# Patient Record
Sex: MALE | Race: WHITE | NOT HISPANIC OR LATINO | Employment: FULL TIME | URBAN - METROPOLITAN AREA
[De-identification: names, ages, dates, MRNs, and addresses within clinical notes are randomized per-mention and may not be internally consistent; named-entity substitution may affect disease eponyms.]

---

## 2023-10-28 LAB
NON-UH HIE ALANINE AMINOTRANSFERASE:CCNC:PT:SER/PLAS:QN:NO ADDITION OF P-5': 17 INT._UNIT/L (ref 6–46)
NON-UH HIE ALBUMIN/GLOBULIN:MCRTO:PT:SER:QN:: 1.6 (ref 1.1–2.2)
NON-UH HIE ALBUMIN:MCNC:PT:SER/PLAS:QN:: 4.2 GM/DL (ref 3.3–5)
NON-UH HIE ALKALINE PHOSPHATASE:CCNC:PT:SER/PLAS:QN:: 70 INT._UNIT/L (ref 21–98)
NON-UH HIE ANION GAP:SCNC:PT:SER/PLAS:QN:: 14 MEQ/L (ref 6–16)
NON-UH HIE ASPARTATE AMINOTRANSFERASE:CCNC:PT:SER/PLAS:QN:: 20 INT._UNIT/L (ref 5–43)
NON-UH HIE BASOPHILS/LEUKOCYTES:NFR.DF:PT:BLD:QN:AUTOMATED COUNT: 0.1 E9/L (ref 0–0.2)
NON-UH HIE BASOPHILS:NCNC:PT:BLD:QN:AUTOMATED COUNT: 1.2 % (ref 0–2)
NON-UH HIE BILIRUBIN:MCNC:PT:SER/PLAS:QN:: 1.3 MG/DL (ref 0–1.1)
NON-UH HIE CALCIUM:MCNC:PT:SER/PLAS:QN:: 9 MG/DL (ref 8.9–11.1)
NON-UH HIE CARBON DIOXIDE:SCNC:PT:SER/PLAS:QN:: 25 MMOL/L (ref 21–31)
NON-UH HIE CHLORIDE:SCNC:PT:SER/PLAS:QN:: 103 MMOL/L (ref 101–111)
NON-UH HIE CREATININE:MCNC:PT:SER/PLAS:QN:: 1.6 MG/DL (ref 0.5–1.3)
NON-UH HIE EOSINOPHILS/LEUKOCYTES:NFR.DF:PT:BLD:QN:AUTOMATED COUNT: 0.4 E9/L (ref 0–0.5)
NON-UH HIE EOSINOPHILS:NCNC:PT:BLD:QN:AUTOMATED COUNT: 4.9 % (ref 0–8)
NON-UH HIE ERYTHROCYTE DISTRIBUTION WIDTH:RATIO:PT:RBC:QN:AUTOMATED COUNT: 14.5 % (ref 10.9–14.2)
NON-UH HIE ERYTHROCYTE MEAN CORPUSCULAR HEMOGLOBIN CONCENTRATION:MCNC:PT:RB: 32.6 GM/DL (ref 31.4–36)
NON-UH HIE ERYTHROCYTE MEAN CORPUSCULAR HEMOGLOBIN:ENTMASS:PT:RBC:QN:AUTOMA: 28.2 PG (ref 27–34)
NON-UH HIE ERYTHROCYTE MEAN CORPUSCULAR VOLUME:ENTVOL:PT:RBC:QN:AUTOMATED C: 86.4 FL (ref 80–100)
NON-UH HIE ERYTHROCYTES:NCNC:PT:BLD:QN:AUTOMATED COUNT: 4.6 E12/L (ref 4.3–5.9)
NON-UH HIE GLOBULIN:MCNC:PT:SER:QN:CALCULATED: 2.7 GM/DL (ref 1.4–4)
NON-UH HIE GLOMERULAR FILTRATION RATE/1.73 SQ M.PREDICTED.NON BLACK:ARVRAT:: 46 ML/MIN/1.73 M2
NON-UH HIE GLUCOSE:MCNC:PT:BLD:QN:: 107 MG/DL (ref 55–199)
NON-UH HIE HEMATOCRIT:VFR:PT:BLD:QN:AUTOMATED COUNT: 39.5 % (ref 37.7–49)
NON-UH HIE HEMOGLOBIN:MCNC:PT:BLD:QN:: 12.9 GM/DL (ref 13.5–17.5)
NON-UH HIE LEUKOCYTES: 7.5 E9/L (ref 4–11)
NON-UH HIE LYMPHOCYTES/LEUKOCYTES:NFR.DF:PT:BLD:QN:AUTOMATED COUNT: 1.8 E9/L (ref 1–4)
NON-UH HIE LYMPHOCYTES:NCNC:PT:BLD:QN:AUTOMATED COUNT: 24.1 % (ref 14–50)
NON-UH HIE MONOCYTES/LEUKOCYTES:NFR.DF:PT:BLD:QN:AUTOMATED COUNT: 0.7 E9/L (ref 0.2–1)
NON-UH HIE MONOCYTES:NCNC:PT:BLD:QN:AUTOMATED COUNT: 9.1 % (ref 4–14)
NON-UH HIE NEUTROPHILS/LEUKOCYTES:NFR.DF:PT:BLD:QN:AUTOMATED COUNT: 4.5 E9/L (ref 2–7.5)
NON-UH HIE NEUTROPHILS:NCNC:PT:BLD:QN:AUTOMATED COUNT: 60.7 % (ref 36–75)
NON-UH HIE PLATELET MEAN VOLUME:ENTVOL:PT:BLD:QN:AUTOMATED COUNT: 7.3 FL (ref 6.4–10.8)
NON-UH HIE PLATELETS:NCNC:PT:BLD:QN:AUTOMATED COUNT: 193 E9/L (ref 150–500)
NON-UH HIE POTASSIUM:SCNC:PT:SER/PLAS:QN:: 4 MMOL/L (ref 3.5–5.3)
NON-UH HIE PROTEIN:MCNC:PT:SER/PLAS:QN:: 6.9 GM/DL (ref 6–7.8)
NON-UH HIE SODIUM:SCNC:PT:SER/PLAS:QN:: 138 MMOL/L (ref 135–145)
NON-UH HIE UREA NITROGEN/CREATININE:MRTO:PT:SER/PLAS:QN:: 18 NO UNITS (ref 10–20)
NON-UH HIE UREA NITROGEN:MCNC:PT:SER/PLAS:QN:: 29 MG/DL (ref 5–21)

## 2023-10-31 LAB — Lab: 4.7 NG/ML (ref 2–20)

## 2023-11-27 ENCOUNTER — OFFICE VISIT (OUTPATIENT)
Dept: TRANSPLANT | Facility: HOSPITAL | Age: 68
End: 2023-11-27
Payer: MEDICARE

## 2023-11-27 ENCOUNTER — APPOINTMENT (OUTPATIENT)
Dept: TRANSPLANT | Facility: HOSPITAL | Age: 68
End: 2023-11-27

## 2023-11-27 VITALS
SYSTOLIC BLOOD PRESSURE: 134 MMHG | OXYGEN SATURATION: 95 % | HEART RATE: 72 BPM | BODY MASS INDEX: 34.85 KG/M2 | TEMPERATURE: 97.7 F | DIASTOLIC BLOOD PRESSURE: 82 MMHG | WEIGHT: 229.2 LBS

## 2023-11-27 DIAGNOSIS — Z94.0 KIDNEY REPLACED BY TRANSPLANT (HHS-HCC): Primary | ICD-10-CM

## 2023-11-27 DIAGNOSIS — Z94.4 LIVER REPLACED BY TRANSPLANT (MULTI): ICD-10-CM

## 2023-11-27 DIAGNOSIS — Z92.25 PERSONAL HISTORY OF IMMUNOSUPRESSION THERAPY: ICD-10-CM

## 2023-11-27 PROCEDURE — 99213 OFFICE O/P EST LOW 20 MIN: CPT

## 2023-11-27 PROCEDURE — 99214 OFFICE O/P EST MOD 30 MIN: CPT | Performed by: INTERNAL MEDICINE

## 2023-11-27 PROCEDURE — 99214 OFFICE O/P EST MOD 30 MIN: CPT | Mod: 25 | Performed by: INTERNAL MEDICINE

## 2023-11-27 PROCEDURE — 1159F MED LIST DOCD IN RCRD: CPT

## 2023-11-27 PROCEDURE — 1126F AMNT PAIN NOTED NONE PRSNT: CPT

## 2023-11-27 PROCEDURE — 1159F MED LIST DOCD IN RCRD: CPT | Performed by: INTERNAL MEDICINE

## 2023-11-27 PROCEDURE — 1126F AMNT PAIN NOTED NONE PRSNT: CPT | Performed by: INTERNAL MEDICINE

## 2023-11-27 RX ORDER — LANOLIN ALCOHOL/MO/W.PET/CERES
CREAM (GRAM) TOPICAL
COMMUNITY
Start: 2016-12-22

## 2023-11-27 RX ORDER — TACROLIMUS 1 MG/1
1 CAPSULE ORAL EVERY 12 HOURS
COMMUNITY
End: 2024-05-17 | Stop reason: SDUPTHER

## 2023-11-27 RX ORDER — ATORVASTATIN CALCIUM 10 MG/1
10 TABLET, FILM COATED ORAL DAILY
COMMUNITY

## 2023-11-27 RX ORDER — GABAPENTIN 300 MG/1
300 CAPSULE ORAL 3 TIMES DAILY
COMMUNITY
End: 2024-05-17 | Stop reason: SDUPTHER

## 2023-11-27 RX ORDER — CICLOPIROX 7.7 MG/G
GEL TOPICAL
COMMUNITY
Start: 2023-06-13

## 2023-11-27 RX ORDER — PREDNISONE 5 MG/1
TABLET ORAL
COMMUNITY
Start: 2017-03-23

## 2023-11-27 RX ORDER — MULTIVITAMIN
1 TABLET ORAL DAILY
COMMUNITY
Start: 2018-02-23

## 2023-11-27 RX ORDER — METOPROLOL TARTRATE 25 MG/1
1 TABLET, FILM COATED ORAL 2 TIMES DAILY
COMMUNITY

## 2023-11-27 RX ORDER — FLUTICASONE PROPIONATE 50 MCG
SPRAY, SUSPENSION (ML) NASAL
COMMUNITY
Start: 2005-08-17

## 2023-11-27 RX ORDER — MYCOPHENOLATE MOFETIL 250 MG/1
500 CAPSULE ORAL EVERY 12 HOURS
COMMUNITY
End: 2024-05-17 | Stop reason: SDUPTHER

## 2023-11-27 RX ORDER — AMOXICILLIN 500 MG/1
CAPSULE ORAL
COMMUNITY
Start: 2023-08-31

## 2023-11-27 RX ORDER — TIZANIDINE 4 MG/1
TABLET ORAL
COMMUNITY
Start: 2023-10-18

## 2023-11-27 RX ORDER — ALLOPURINOL 300 MG/1
300 TABLET ORAL DAILY
COMMUNITY

## 2023-11-27 ASSESSMENT — PAIN SCALES - GENERAL: PAINLEVEL: 0-NO PAIN

## 2023-11-27 NOTE — PROGRESS NOTES
Subjective   Dandre Birmingham is a 68 y.o. male who underwent kidney and liver kidney transplant on 4/4/2016 (Kidney), 4/4/2016 (Liver). Here today for follow-up. Has back pain radiating to left leg due to pinched nerves.    Objective     Physical Exam  Constitutional:       Appearance: Normal appearance.   HENT:      Head: Normocephalic.      Mouth/Throat:      Mouth: Mucous membranes are moist.   Cardiovascular:      Rate and Rhythm: Normal rate and regular rhythm.   Pulmonary:      Effort: Pulmonary effort is normal.      Breath sounds: Normal breath sounds.   Abdominal:      General: Abdomen is flat.      Palpations: Abdomen is soft.   Musculoskeletal:         General: Normal range of motion.      Cervical back: Normal range of motion.   Skin:     General: Skin is warm and dry.   Neurological:      General: No focal deficit present.      Mental Status: He is alert.   Psychiatric:         Mood and Affect: Mood normal.       Lab Results   Component Value Date    CREATININE 1.79 (H) 10/12/2017    K 4.7 10/12/2017    GLUCOSE 123 (H) 10/12/2017    HCT 28.5 (L) 10/26/2017    WBC 4.0 (L) 10/26/2017     (L) 10/26/2017    CALCIUM 9.6 10/12/2017     Assessment/Plan     Stable renal allograft function. Therapeutic Tacrolimus level. No changes to immunosuppression.

## 2023-11-27 NOTE — PROGRESS NOTES
Subjective   Patient ID: Dandre Birmingham is a 68 y.o. male who presents for Liver Post Transplant Follow-up.Also kidney transplant .    HPI  Liver doing well post transplant in 2016.   No recurrance of HCC.   Liver and kidney function well.   Back pain with radiation down legs.   Otherwise feels well.      Review of Systems  No report of nausea vomiting or diarrhea.      Objective   Physical Exam  Physical Exam  Constitutional:       Appearance: Normal appearance.   Eyes:      General: No scleral icterus.  Cardiovascular:      Rate and Rhythm: Normal rate and regular rhythm.      Heart sounds: No murmur heard.     No gallop.   Pulmonary:      Effort: Pulmonary effort is normal.      Breath sounds: Normal breath sounds.   Abdominal:      General: Abdomen is flat. Bowel sounds are normal. There is no distension.      Palpations: Abdomen is soft. There is no fluid wave, hepatomegaly or splenomegaly.     Tenderness: There is no abdominal tenderness.   Musculoskeletal:      Cervical back: Normal range of motion. No tenderness.      Right lower leg: No edema.      Left lower leg: No edema.   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     Lymphadenopathy:      Cervical: No cervical adenopathy.   Skin:     Coloration: Skin is not jaundiced.   Neurological:      General: No focal deficit present.      Mental Status: She is alert.       Assessment/Plan     Doing well status post liver and kidney transplant.   Follow up in 6 months labs every 3 months.   Continue tacrolimus and low dose prednisone.

## 2024-01-27 LAB
NON-UH HIE A/G RATIO: 1.8 (ref 1.1–2.2)
NON-UH HIE AGAP: 13 MEQ/L (ref 6–16)
NON-UH HIE ALBUMIN LVL: 4.4 GM/DL (ref 3.3–5)
NON-UH HIE ALK PHOS: 65 INT._UNIT/L (ref 21–98)
NON-UH HIE ALT: 13 INT._UNIT/L (ref 6–46)
NON-UH HIE AST: 16 INT._UNIT/L (ref 5–43)
NON-UH HIE BASOPHIL ABSOLUTE: 0.1 E9/L (ref 0–0.2)
NON-UH HIE BASOPHIL AUTO: 1.1 % (ref 0–2)
NON-UH HIE BILI TOTAL: 1.7 MG/DL (ref 0–1.1)
NON-UH HIE BUN/CREAT RATIO: 15 NO UNITS (ref 10–20)
NON-UH HIE BUN: 22 MG/DL (ref 5–21)
NON-UH HIE CALCIUM LVL: 9.6 MG/DL (ref 8.9–11.1)
NON-UH HIE CHLORIDE: 102 MMOL/L (ref 101–111)
NON-UH HIE CO2: 28 MMOL/L (ref 21–31)
NON-UH HIE CREATININE: 1.5 MG/DL (ref 0.5–1.3)
NON-UH HIE EGFR: 50 ML/MIN/1.73 M2
NON-UH HIE EOS ABSOLUTE: 0.3 E9/L (ref 0–0.5)
NON-UH HIE EOS AUTO: 3.3 % (ref 0–8)
NON-UH HIE GLOBULIN: 2.4 GM/DL (ref 1.4–4)
NON-UH HIE GLUCOSE LVL: 102 MG/DL (ref 55–199)
NON-UH HIE HCT: 42 % (ref 37.7–49)
NON-UH HIE HGB: 13.8 GM/DL (ref 13.5–17.5)
NON-UH HIE LYMPH ABSOLUTE: 2.5 E9/L (ref 1–4)
NON-UH HIE LYMPH AUTO: 31.9 % (ref 14–50)
NON-UH HIE MCH: 28.5 PG (ref 27–34)
NON-UH HIE MCHC: 33 GM/DL (ref 31.4–36)
NON-UH HIE MCV: 86.2 FL (ref 80–100)
NON-UH HIE MONO ABSOLUTE: 0.6 E9/L (ref 0.2–1)
NON-UH HIE MONO AUTO: 8.2 % (ref 4–14)
NON-UH HIE MPV: 7.5 FL (ref 6.4–10.8)
NON-UH HIE NEUTRO ABSOLUTE: 4.3 E9/L (ref 2–7.5)
NON-UH HIE NEUTRO AUTO: 55.5 % (ref 36–75)
NON-UH HIE PLATELET: 197 E9/L (ref 150–500)
NON-UH HIE POTASSIUM LVL: 4 MMOL/L (ref 3.5–5.3)
NON-UH HIE RBC: 4.8 E12/L (ref 4.3–5.9)
NON-UH HIE RDW: 14.2 % (ref 10.9–14.2)
NON-UH HIE SODIUM LVL: 139 MMOL/L (ref 135–145)
NON-UH HIE TOTAL PROTEIN: 6.8 GM/DL (ref 6–7.8)
NON-UH HIE WBC: 7.8 E9/L (ref 4–11)

## 2024-01-30 LAB
ALANINE AMINOTRANSFERASE (SGPT) (U/L) IN SER/PLAS EXTERNAL: 13 U/L
ALBUMIN (G/DL) IN SER/PLAS EXTERNAL: 4.4 G/DL
ALKALINE PHOSPHATASE (U/L) IN SER/PLAS EXTERNAL: 65 U/L
ASPARTATE AMINOTRANSFERASE (SGOT) (U/L) IN SER/PLAS EXTERNAL: 16 U/L
BILIRUBIN TOTAL (MG/DL) IN SER/PLAS EXTERNAL: 13 MG/DL
CALCIUM (MG/DL) IN SER/PLAS EXTERNAL: 9.6 MG/DL
CARBON DIOXIDE, TOTAL (MMOL/L) IN SER/PLAS EXTERNAL: 28 MMOL/L
CHLORIDE (MMOL/L) IN SER/PLAS EXTERNAL: 102 MMOL/L
CREATININE (MG/DL) IN SER/PLAS EXTERNAL: 1.5 MG/DL
GLOMERULAR FILTRATION RATE ML/MIN/1.73 SQ M.PREDICTED EXTERNAL: 50 ML/MIN/1.73M*2
GLUCOSE (MG/DL) IN SER/PLAS EXTERNAL: 102 MG/DL
HEMATOCRIT (%) IN BLOOD BY AUTOMATED COUNT EXTERNAL: 42 %
HEMOGLOBIN (G/DL) IN BLOOD EXTERNAL: 13.8 G/DL
LEUKOCYTES (10*3/UL) IN BLOOD BY AUTOMATED COUNT EXTERNAL: 7.8 X10*3/UL
NRBC (PER 100 WBCS) BY AUTOMATED COUNT EXTERNAL: 4.8 /100 WBCS
PLATELETS (10*3/UL) IN BLOOD AUTOMATED COUNT EXTERNAL: 197 X10*3/UL
POTASSIUM (MMOL/L) IN SER/PLAS EXTERMA;: 4 MMOL/L
PROTEIN TOTAL EXTERNAL: 6.8 G/DL
SODIUM (MMOL/L) IN SER/PLAS EXTERNAL: 139 MMOL/L
UREA NITROGEN (MG/DL) IN SER/PLAS EXTERNAL: 22 MG/DL

## 2024-01-31 LAB — Lab: 4.8 NG/ML (ref 2–20)

## 2024-04-30 LAB
ALANINE AMINOTRANSFERASE (SGPT) (U/L) IN SER/PLAS EXTERNAL: 14 U/L
ALBUMIN (G/DL) IN SER/PLAS EXTERNAL: 4.4 G/DL
ALKALINE PHOSPHATASE (U/L) IN SER/PLAS EXTERNAL: 76 U/L
ASPARTATE AMINOTRANSFERASE (SGOT) (U/L) IN SER/PLAS EXTERNAL: 18 U/L
BILIRUBIN TOTAL (MG/DL) IN SER/PLAS EXTERNAL: 1.4 MG/DL
CALCIUM (MG/DL) IN SER/PLAS EXTERNAL: 9.4 MG/DL
CARBON DIOXIDE, TOTAL (MMOL/L) IN SER/PLAS EXTERNAL: 103 MMOL/L
CHLORIDE (MMOL/L) IN SER/PLAS EXTERNAL: 103 MMOL/L
CREATININE (MG/DL) IN SER/PLAS EXTERNAL: 1.5 MG/DL
ERYTHROCYTES (10*6/UL) IN BLOOD BY AUTOMATED COUNT EXTERNAL: 4.7 X10*6/UL
GLOMERULAR FILTRATION RATE ML/MIN/1.73 SQ M.PREDICTED EXTERNAL: 50 ML/MIN/1.73M*2
GLUCOSE (MG/DL) IN SER/PLAS EXTERNAL: 100 MG/DL
HEMATOCRIT (%) IN BLOOD BY AUTOMATED COUNT EXTERNAL: 40.5 %
HEMOGLOBIN (G/DL) IN BLOOD EXTERNAL: 13.3 G/DL
LEUKOCYTES (10*3/UL) IN BLOOD BY AUTOMATED COUNT EXTERNAL: 8.2 X10*3/UL
Lab: 5 NG/ML (ref 2–20)
PLATELETS (10*3/UL) IN BLOOD AUTOMATED COUNT EXTERNAL: 194 X10*3/UL
POTASSIUM (MMOL/L) IN SER/PLAS EXTERMA;: 4.2 MMOL/L
PROTEIN TOTAL EXTERNAL: 7 G/DL
SODIUM (MMOL/L) IN SER/PLAS EXTERNAL: 140 MMOL/L
UREA NITROGEN (MG/DL) IN SER/PLAS EXTERNAL: 25 MG/DL

## 2024-05-01 LAB — TACROLIMUS (NG/ML) IN BLOOD: 5 NG/ML

## 2024-05-17 ENCOUNTER — TELEPHONE (OUTPATIENT)
Dept: TRANSPLANT | Facility: HOSPITAL | Age: 69
End: 2024-05-17
Payer: MEDICARE

## 2024-05-17 DIAGNOSIS — G62.9 NEUROPATHY: ICD-10-CM

## 2024-05-17 DIAGNOSIS — Z94.0 KIDNEY REPLACED BY TRANSPLANT (HHS-HCC): ICD-10-CM

## 2024-05-17 DIAGNOSIS — Z94.4 LIVER REPLACED BY TRANSPLANT (MULTI): ICD-10-CM

## 2024-05-17 NOTE — TELEPHONE ENCOUNTER
PT called - needs Gabapentin refilled at [facility], needs Tacrolimus and Mycophenolate refilled at [facility] in [place]. His number is [phone]

## 2024-05-20 ENCOUNTER — OFFICE VISIT (OUTPATIENT)
Dept: TRANSPLANT | Facility: HOSPITAL | Age: 69
End: 2024-05-20
Payer: MEDICARE

## 2024-05-20 ENCOUNTER — DOCUMENTATION (OUTPATIENT)
Dept: TRANSPLANT | Facility: HOSPITAL | Age: 69
End: 2024-05-20
Payer: MEDICARE

## 2024-05-20 VITALS
BODY MASS INDEX: 35.79 KG/M2 | HEART RATE: 75 BPM | SYSTOLIC BLOOD PRESSURE: 126 MMHG | DIASTOLIC BLOOD PRESSURE: 87 MMHG | OXYGEN SATURATION: 97 % | WEIGHT: 235.4 LBS | TEMPERATURE: 97.7 F

## 2024-05-20 VITALS
WEIGHT: 235.4 LBS | OXYGEN SATURATION: 97 % | BODY MASS INDEX: 35.79 KG/M2 | DIASTOLIC BLOOD PRESSURE: 87 MMHG | SYSTOLIC BLOOD PRESSURE: 126 MMHG | TEMPERATURE: 97.7 F | HEART RATE: 75 BPM

## 2024-05-20 DIAGNOSIS — E55.9 VITAMIN D DEFICIENCY: ICD-10-CM

## 2024-05-20 DIAGNOSIS — Z94.0 IMMUNOSUPPRESSIVE MANAGEMENT ENCOUNTER FOLLOWING KIDNEY TRANSPLANT (HHS-HCC): Primary | ICD-10-CM

## 2024-05-20 DIAGNOSIS — E21.3 HYPERPARATHYROIDISM (MULTI): ICD-10-CM

## 2024-05-20 DIAGNOSIS — Z94.0 KIDNEY REPLACED BY TRANSPLANT (HHS-HCC): ICD-10-CM

## 2024-05-20 DIAGNOSIS — I10 ESSENTIAL HYPERTENSION: ICD-10-CM

## 2024-05-20 DIAGNOSIS — Z94.4 LIVER REPLACED BY TRANSPLANT (MULTI): Primary | ICD-10-CM

## 2024-05-20 DIAGNOSIS — Z79.899 IMMUNOSUPPRESSIVE MANAGEMENT ENCOUNTER FOLLOWING KIDNEY TRANSPLANT (HHS-HCC): Primary | ICD-10-CM

## 2024-05-20 DIAGNOSIS — Z94.4 LIVER REPLACED BY TRANSPLANT (MULTI): ICD-10-CM

## 2024-05-20 PROCEDURE — 99214 OFFICE O/P EST MOD 30 MIN: CPT | Performed by: INTERNAL MEDICINE

## 2024-05-20 PROCEDURE — 3079F DIAST BP 80-89 MM HG: CPT | Performed by: INTERNAL MEDICINE

## 2024-05-20 PROCEDURE — 3074F SYST BP LT 130 MM HG: CPT | Performed by: INTERNAL MEDICINE

## 2024-05-20 PROCEDURE — 1126F AMNT PAIN NOTED NONE PRSNT: CPT | Performed by: INTERNAL MEDICINE

## 2024-05-20 RX ORDER — MYCOPHENOLATE MOFETIL 250 MG/1
500 CAPSULE ORAL EVERY 12 HOURS
Qty: 360 CAPSULE | Refills: 3 | Status: SHIPPED | OUTPATIENT
Start: 2024-05-20 | End: 2024-05-22 | Stop reason: SDUPTHER

## 2024-05-20 RX ORDER — TACROLIMUS 1 MG/1
1 CAPSULE ORAL EVERY 12 HOURS
Qty: 180 CAPSULE | Refills: 3 | Status: SHIPPED | OUTPATIENT
Start: 2024-05-20 | End: 2024-05-22 | Stop reason: SDUPTHER

## 2024-05-20 RX ORDER — GABAPENTIN 300 MG/1
300 CAPSULE ORAL 3 TIMES DAILY
Qty: 270 CAPSULE | Refills: 3 | Status: SHIPPED | OUTPATIENT
Start: 2024-05-20

## 2024-05-20 ASSESSMENT — PAIN SCALES - GENERAL
PAINLEVEL: 0-NO PAIN
PAINLEVEL: 0-NO PAIN

## 2024-05-20 NOTE — PATIENT INSTRUCTIONS
No changes in Immunosuppression medication  Labs per liver schedule  Added UPC, vit D and PTH and DUS of both kidney/bladder with PVR   RTC in 6 m per liver schedule

## 2024-05-20 NOTE — PROGRESS NOTES
Dr. Patel stopped by and said Dandre mentioned to him that he needs refills sent, also needs a new lab requisition. Message sent about refills on Friday.

## 2024-05-20 NOTE — Clinical Note
No changes in Immunosuppression medication Labs per liver schedule Added UPC, vit D and PTH and DUS of both kidney/bladder with PVR  RTC in 6 m

## 2024-05-20 NOTE — PROGRESS NOTES
TRANSPLANT NEPHROLOGY :   OUTPATIENT CLINIC NOTE      SERVICE DATE : 05/20/2024    REASON FOR VISIT/CHIEF COMPLAINT:  S/P  TRANSPLANT SURGERY  IMMUNOSUPPRESSIVE MEDICATION MANAGEMENT  BLOOD PRESSURE MANAGEMENT    HPI:    Mr. Birmingham is a 69 y.o. male with past medical history significant for HCV cirrhosis/HCC, ESLD and End Stage Renal Disease who underwent a simultaneous liver and kidney transplant on 4/4/2016. Post operatively, he had a complicated hospital course with DVTs and bilateral pleural effusions and septic shock requiring pressors and BIPAP. He continues to follow up with liver transplant team and was last seen by Dr. Patel on 11/27/2017. He had a rejection of transplanted kidney and treated with pulse steroid. His baseline creatinine had been around 1.4-1.6.     Patient is here for follow up s/p kidney transplant.    He saw Dr. Patel earlier today.    Patient is doing well overall. No new complaints. Denied chest pain, SOB, BABCOCK, Palpitation. Normal urination and bowel movement. Normal gait and no weakness of arms/legs. No cough, runny nose, sore throat, cold symptoms, or rash. No hearing loss. Normal vision.No problems with his sleep, mood and function. No recent infection, hospitalization, surgery or ER visits.      ROS:  Review of  14 systems was performed system by system. See HPI. Otherwise, the symptoms were negative.    PAST MEDICAL HISTORY:  Past Medical History:   Diagnosis Date    Personal history of other diseases of the circulatory system     History of hypertension    Personal history of other diseases of the musculoskeletal system and connective tissue     History of arthritis    Personal history of other infectious and parasitic diseases 01/05/2021    History of hepatitis C virus infection    Personal history of urinary calculi     History of renal calculi        PAST SURGICAL HISTORY:  Past Surgical History:   Procedure Laterality Date    CT ABDOMEN PELVIS ANGIOGRAM W AND/OR WO IV  CONTRAST  3/31/2016    CT ABDOMEN PELVIS ANGIOGRAM W AND/OR WO IV CONTRAST 3/31/2016 [facility] INPATIENT LEGACY    CT ANGIO NECK W AND WO IV CONTRAST  3/11/2016    CT NECK ANGIO W AND WO IV CONTRAST 3/11/2016 [facility] INPATIENT LEGACY    CT HEAD ANGIO W AND WO IV CONTRAST  3/11/2016    CT HEAD ANGIO W AND WO IV CONTRAST 3/11/2016 [facility] INPATIENT LEGACY    HERNIA REPAIR  12/10/2015    Hernia Repair    IR ANGIOGRAM AORTA THORACIC  3/31/2016    IR ANGIOGRAM AORTA THORACIC 3/31/2016 [facility] INPATIENT LEGACY    IR EMBOLIZATION LYMPH NODE Bilateral 3/31/2016    IR EMBOLIZATION LYMPH NODE 3/31/2016 [facility] INPATIENT LEGACY    KIDNEY SURGERY  07/28/2016    Kidney Surgery    KNEE ARTHROSCOPY W/ DEBRIDEMENT  12/10/2015    Knee Arthroscopy (Therapeutic)    MR HEAD ANGIO WO IV CONTRAST  3/12/2016    MR HEAD ANGIO WO IV CONTRAST 3/12/2016 [facility] INPATIENT LEGACY    MR NECK ANGIO WO IV CONTRAST  3/12/2016    MR NECK ANGIO WO IV CONTRAST 3/12/2016 [facility] INPATIENT LEGACY    OTHER SURGICAL HISTORY  01/05/2021    Liver Transplant - Orthotopic    OTHER SURGICAL HISTORY  07/28/2016    Partial Hepatectomy    TONSILLECTOMY  12/10/2015    Tonsillectomy    US GUIDED ABDOMINAL PARACENTESIS  2/2/2016    US GUIDED ABDOMINAL PARACENTESIS 2/2/2016 [facility] INPATIENT LEGACY    US GUIDED ABDOMINAL PARACENTESIS  2/24/2016    US GUIDED ABDOMINAL PARACENTESIS 2/24/2016 [facility] INPATIENT LEGACY    US GUIDED ABDOMINAL PARACENTESIS  3/3/2016    US GUIDED ABDOMINAL PARACENTESIS 3/3/2016 [facility] INPATIENT LEGACY    US GUIDED ABDOMINAL PARACENTESIS  3/5/2016    US GUIDED ABDOMINAL PARACENTESIS 3/5/2016 [facility] INPATIENT LEGACY    US GUIDED ABDOMINAL PARACENTESIS  3/6/2016    US GUIDED ABDOMINAL PARACENTESIS 3/6/2016 [facility] INPATIENT LEGACY    US GUIDED ABDOMINAL PARACENTESIS  3/7/2016    US GUIDED ABDOMINAL PARACENTESIS 3/7/2016 [facility] INPATIENT LEGACY    US GUIDED ABSCESS DRAIN  2/5/2016    US GUIDED ABSCESS DRAIN 2/5/2016 [facility] INPATIENT LEGACY    US GUIDED ABSCESS DRAIN  3/14/2016    US GUIDED ABSCESS  DRAIN 3/14/2016 [facility] INPATIENT LEGACY    US GUIDED NEEDLE LIVER BIOPSY  4/29/2016    US GUIDED NEEDLE LIVER BIOPSY 4/29/2016 [facility] INPATIENT LEGACY    US GUIDED PERCUTANEOUS PERITONEAL OR RETROPERITONEAL FLUID COLLECTION DRAINAGE  2/5/2016    US GUIDED PERCUTANEOUS PERITONEAL OR RETROPERITONEAL FLUID COLLECTION DRAINAGE 2/5/2016 [facility] INPATIENT LEGACY        SOCIAL HISTORY:  Social History     Socioeconomic History    Marital status:      Spouse name: Not on file    Number of children: Not on file    Years of education: Not on file    Highest education level: Not on file   Occupational History    Not on file   Tobacco Use    Smoking status: Not on file    Smokeless tobacco: Not on file   Substance and Sexual Activity    Alcohol use: Not on file    Drug use: Not on file    Sexual activity: Not on file   Other Topics Concern    Not on file   Social History Narrative    Not on file     Social Determinants of Health     Financial Resource Strain: Not on file   Food Insecurity: Not on file   Transportation Needs: Not on file   Physical Activity: Not on file   Stress: Not on file   Social Connections: Not on file   Intimate Partner Violence: Not on file   Housing Stability: Not on file       FAMILY HISTORY:  No family history on file.    MEDICATION LIST:  Current Outpatient Medications   Medication Instructions    ACETAMINOPHEN ORAL oral    allopurinol (ZYLOPRIM) 300 mg, oral, Daily    amoxicillin (Amoxil) 500 mg capsule TAKE FOUR CAPSULES BY MOUTH ONE HOUR BEFORE APPOINTMENT    atorvastatin (LIPITOR) 10 mg, oral, Daily    ciclopirox (Loprox) 0.77 % gel APPLY TOPICALLY TO AFFECTED AREA(S) TWICE DAILY    fluticasone (Flonase) 50 mcg/actuation nasal spray nasal    gabapentin (NEURONTIN) 300 mg, oral, 3 times daily    magnesium oxide (Mag-Ox) 400 mg (241.3 mg magnesium) tablet oral    metoprolol tartrate (Lopressor) 25 mg tablet 1 tablet, oral, 2 times daily    multivitamin tablet 1 tablet, oral, Daily    mycophenolate  (CELLCEPT) 500 mg, oral, Every 12 hours    predniSONE (Deltasone) 5 mg tablet oral    tacrolimus (PROGRAF) 1 mg, oral, Every 12 hours    tiZANidine (Zanaflex) 4 mg tablet        ALLERGY  No Known Allergies    PHYSICAL EXAM:    Visit Vitals  /87   Pulse 75   Temp 36.5 °C (97.7 °F) (Temporal)   Wt 107 kg (235 lb 6.4 oz)   SpO2 97%   BMI 35.79 kg/m²   BSA 2.27 m²          Vital signs - reviewed. Acceptable BP at this office visit.   General Appearance - NAD, Good speech, oriented and alert  HEENT - Supple. Not pale. No jaundice. No cervical lymphadenopathy. Pharynx and tonsils are not injected.  CVS - RRR. Normal S1/S2. No murmur, click , rub or gallop  Lungs- clear to auscultation bilaterally  Abdomen - soft , not tender, no guarding, no rigidity. No hepatosplenomegaly. Normal bowel sounds. No masses and ascites. S/P Kidney transplant .  Transplanted kidney is not tender.   Musculoskeletal /Extremities - no edema. Full ROM. No joint tenderness.   Neuro/Psych - appropriate mood and affect. Motor power V/V all extremities. CN I -XII were grossly intact.  Skin - No visible rash      LABS:    Lab Results   Component Value Date    WBC 8.2 04/27/2024    HGB 13.3 04/27/2024    HCT 40.5 04/27/2024     04/27/2024    CHOL 189 04/19/2018    CHOL 189 04/19/2018    TRIG 263 (H) 04/19/2018    HDL 36.2 (A) 04/19/2018    HDL 36.2 (A) 04/19/2018    ALT 14 04/27/2024    AST 18 04/27/2024     04/27/2024    K 4.2 04/27/2024     04/27/2024    CREATININE 1.50 04/27/2024    BUN 25 04/27/2024    CO2 103 04/27/2024    INR 1.0 10/12/2017    HGBA1C 5.1 04/19/2018     par    ASSESSMENT AND PLAN:    Mr. Birmingham is a 69 y.o. male  who is here for follow up s/p kidney transplant.    TRANSPLANT DATE: 4/4/2016 (Kidney), 4/4/2016 (Liver)      1. ESRD S/P kidney transplant   - Creatinine last check was :  Lab Results   Component Value Date    CREATININE 1.50 04/27/2024     Creatinine clearance cannot be calculated (Patient's  most recent lab result is older than the maximum 7 days allowed.)    - Renal allograft function is STABLE.   -Random urine protein/creatinine ratio is DUE  -Ensure adequate hydration  - Avoid nephrotoxic medications, NSAIDs, and IV contrast.    2. Immunosuppression  -Continue current immunosuppression regimen.    3. Electrolytes  Lab Results   Component Value Date    GLUCOSE 100 04/27/2024    CALCIUM 9.4 04/27/2024     04/27/2024    K 4.2 04/27/2024    CO2 103 04/27/2024     04/27/2024    BUN 25 04/27/2024    CREATININE 1.50 04/27/2024     -Acceptable from last lab drawn    4. Hypertension  Blood Pressures         5/20/2024  1010             BP: 126/87          -Home  BP had been acceptable  -Encourage to monitor home BP  -Continue current anti hypertensive medication    5. Bone Mineral Disease/Osteoporosis  Lab Results   Component Value Date    CALCIUM 9.4 04/27/2024     -check VIT D, PTH with next lab  - Consider DEXA every 2-3 years , defer to PCP    6.Anemia  Lab Results   Component Value Date    WBC 8.2 04/27/2024    HGB 13.3 04/27/2024    HCT 40.5 04/27/2024    MCV 92 10/26/2017     04/27/2024     -asymptomatic  - Continue to monitor  -check iron studies and ferritin. Will consider MIRACLE as needed.  - No indications for blood transfusion     7.Health maintenance and vaccination  - Flu shot during flu season annually  - Cancer screening is up to date per the patient    Summary    Due for UPC ratio, Vit d, PTH  RCC screening, US native and transplant kidneys. Check bladder PVR.   Routine lab and next follow up per hepatology, 6 months    [facility]    Transplant Nephrology

## 2024-05-20 NOTE — PROGRESS NOTES
Subjective   Patient ID: Dandre Birmingham is a 69 y.o. male who presents for Follow up for liver and kidney transplant.   HPI Feels well.   Needs refills on medications.   Has small abdominal hernia.   No abdominal pain.   BP good today.      Review of Systems  Does not report nausea vomiting or diarrhea.    Objective   Physical Exam  Physical Exam  Constitutional:       Appearance: Normal appearance.   Eyes:      General: No scleral icterus.  Cardiovascular:      Rate and Rhythm: Normal rate and regular rhythm.      Heart sounds: No murmur heard.     No gallop.   Pulmonary:      Effort: Pulmonary effort is normal.      Breath sounds: Normal breath sounds.   Abdominal:      General: Abdomen is flat. Bowel sounds are normal. There is no distension.      Palpations: Abdomen is soft. There is no fluid wave, hepatomegaly or splenomegaly.   Small abdominal hernia easily reducable.   No pain.        Tenderness: There is no abdominal tenderness.   Musculoskeletal:      Cervical back: Normal range of motion. No tenderness.      Right lower leg: No edema.      Left lower leg: No edema.   Lymphadenopathy:      Cervical: No cervical adenopathy.   Skin:     Coloration: Skin is not jaundiced.   Neurological:      General: No focal deficit present.      Mental Status: She is alert.       Assessment/Plan     Feeling well.    No problems.   Will also follow up today with nephrology.    Continue current immune suppression with tacro, MMF and predniosne.    Follow up in 6 months.           Luis Alberto Patel MD 05/20/24 10:05 AM

## 2024-05-22 ENCOUNTER — TELEPHONE (OUTPATIENT)
Dept: TRANSPLANT | Facility: HOSPITAL | Age: 69
End: 2024-05-22
Payer: MEDICARE

## 2024-05-22 DIAGNOSIS — Z94.0 KIDNEY REPLACED BY TRANSPLANT (HHS-HCC): ICD-10-CM

## 2024-05-22 DIAGNOSIS — Z94.4 LIVER REPLACED BY TRANSPLANT (MULTI): ICD-10-CM

## 2024-05-22 RX ORDER — MYCOPHENOLATE MOFETIL 250 MG/1
500 CAPSULE ORAL EVERY 12 HOURS
Qty: 360 CAPSULE | Refills: 3 | Status: SHIPPED | OUTPATIENT
Start: 2024-05-22 | End: 2024-05-23 | Stop reason: SDUPTHER

## 2024-05-22 RX ORDER — TACROLIMUS 1 MG/1
1 CAPSULE ORAL EVERY 12 HOURS
Qty: 180 CAPSULE | Refills: 3 | Status: SHIPPED | OUTPATIENT
Start: 2024-05-22 | End: 2024-05-23 | Stop reason: SDUPTHER

## 2024-05-22 NOTE — TELEPHONE ENCOUNTER
Call placed to patient to see if he needed our team to schedule his appt.   Patient would like to go to local [facility]?  Patient requesting orders mailed to patient.     Orders printed and mailed.

## 2024-05-22 NOTE — TELEPHONE ENCOUNTER
Called and spoke to Dandre, when Dr. Patel went to resign script for tacro and MMF it asked for a printer, which is the likely reason the scripts have not gone through to [facility].  I called them in this afternoon.  Also discussed sending updated standing order and orders from the kidney team to [facility].  I told him I would fax them and then put copy in the mail to him so he has them.  Suni Vogel RN

## 2024-05-23 DIAGNOSIS — Z94.4 LIVER REPLACED BY TRANSPLANT (MULTI): ICD-10-CM

## 2024-05-23 DIAGNOSIS — Z94.0 KIDNEY REPLACED BY TRANSPLANT (HHS-HCC): ICD-10-CM

## 2024-05-23 RX ORDER — TACROLIMUS 1 MG/1
1 CAPSULE ORAL EVERY 12 HOURS
Qty: 180 CAPSULE | Refills: 3 | Status: SHIPPED | OUTPATIENT
Start: 2024-05-23 | End: 2024-05-24

## 2024-05-23 RX ORDER — MYCOPHENOLATE MOFETIL 250 MG/1
500 CAPSULE ORAL EVERY 12 HOURS
Qty: 360 CAPSULE | Refills: 3 | Status: SHIPPED | OUTPATIENT
Start: 2024-05-23 | End: 2024-05-28 | Stop reason: SDUPTHER

## 2024-05-24 DIAGNOSIS — Z94.0 KIDNEY REPLACED BY TRANSPLANT (HHS-HCC): ICD-10-CM

## 2024-05-24 DIAGNOSIS — Z94.4 LIVER REPLACED BY TRANSPLANT (MULTI): ICD-10-CM

## 2024-05-24 NOTE — TELEPHONE ENCOUNTER
PT called to get RX - it was called in to pharmacy, but because medicare won't allow meds to be phoned in,. Suni has to redo the RX and get a doctor to sign it. She will jamari;l him back

## 2024-05-24 NOTE — TELEPHONE ENCOUNTER
[facility] need new Rxs e-scripted for Tacro and Mycophenolate with ICD10 codes,or call them at [phone]

## 2024-05-28 RX ORDER — MYCOPHENOLATE MOFETIL 250 MG/1
500 CAPSULE ORAL EVERY 12 HOURS
Qty: 120 CAPSULE | Refills: 11 | Status: SHIPPED | OUTPATIENT
Start: 2024-05-28

## 2024-05-28 RX ORDER — TACROLIMUS 1 MG/1
1 CAPSULE ORAL 2 TIMES DAILY
Qty: 60 CAPSULE | Refills: 11 | Status: SHIPPED | OUTPATIENT
Start: 2024-05-28 | End: 2025-05-28

## 2024-05-29 NOTE — TELEPHONE ENCOUNTER
Called and spoke to [facility] pharmacy and confirmed that they received Tacro and MMF scripts and were able to fill them and they were.  Suni Vogel RN

## 2024-06-05 ENCOUNTER — HOSPITAL ENCOUNTER (OUTPATIENT)
Dept: RADIOLOGY | Facility: CLINIC | Age: 69
Discharge: HOME | End: 2024-06-05
Payer: MEDICARE

## 2024-06-05 DIAGNOSIS — E55.9 VITAMIN D DEFICIENCY: ICD-10-CM

## 2024-06-05 DIAGNOSIS — I10 ESSENTIAL HYPERTENSION: ICD-10-CM

## 2024-06-05 DIAGNOSIS — Z94.0 KIDNEY REPLACED BY TRANSPLANT (HHS-HCC): ICD-10-CM

## 2024-06-05 DIAGNOSIS — Z79.899 IMMUNOSUPPRESSIVE MANAGEMENT ENCOUNTER FOLLOWING KIDNEY TRANSPLANT (HHS-HCC): ICD-10-CM

## 2024-06-05 DIAGNOSIS — Z94.4 LIVER REPLACED BY TRANSPLANT (MULTI): ICD-10-CM

## 2024-06-05 DIAGNOSIS — Z94.0 IMMUNOSUPPRESSIVE MANAGEMENT ENCOUNTER FOLLOWING KIDNEY TRANSPLANT (HHS-HCC): ICD-10-CM

## 2024-06-05 DIAGNOSIS — E21.3 HYPERPARATHYROIDISM (MULTI): ICD-10-CM

## 2024-06-05 PROCEDURE — 76770 US EXAM ABDO BACK WALL COMP: CPT | Performed by: STUDENT IN AN ORGANIZED HEALTH CARE EDUCATION/TRAINING PROGRAM

## 2024-06-05 PROCEDURE — 76770 US EXAM ABDO BACK WALL COMP: CPT

## 2024-06-05 PROCEDURE — 76776 US EXAM K TRANSPL W/DOPPLER: CPT

## 2024-06-05 PROCEDURE — 76776 US EXAM K TRANSPL W/DOPPLER: CPT | Performed by: STUDENT IN AN ORGANIZED HEALTH CARE EDUCATION/TRAINING PROGRAM

## 2024-06-19 DIAGNOSIS — Z94.4 LIVER REPLACED BY TRANSPLANT (MULTI): ICD-10-CM

## 2024-06-19 DIAGNOSIS — Z94.0 KIDNEY REPLACED BY TRANSPLANT (HHS-HCC): ICD-10-CM

## 2024-06-19 RX ORDER — PREDNISONE 5 MG/1
5 TABLET ORAL DAILY
Qty: 30 TABLET | Refills: 11 | Status: SHIPPED | OUTPATIENT
Start: 2024-06-19 | End: 2025-06-19

## 2024-06-27 LAB
Lab: 21.3 MG/DL
NON-UH HIE U CREATININE: 76.9 MG/DL

## 2024-07-08 LAB — NON-UH HIE CALCIDIOL:MCNC:PT:SER/PLAS:QN:: 46.9 NG/ML (ref 30–100)

## 2024-07-10 LAB — Lab: 53 PG/ML (ref 15–65)

## 2024-07-27 LAB
NON-UH HIE ALANINE AMINOTRANSFERASE:CCNC:PT:SER/PLAS:QN:NO ADDITION OF P-5': 10 INT._UNIT/L (ref 6–46)
NON-UH HIE ALBUMIN/GLOBULIN:MCRTO:PT:SER:QN:: 1.7 (ref 1.1–2.2)
NON-UH HIE ALBUMIN:MCNC:PT:SER/PLAS:QN:: 4.3 GM/DL (ref 3.3–5)
NON-UH HIE ALKALINE PHOSPHATASE:CCNC:PT:SER/PLAS:QN:: 84 INT._UNIT/L (ref 21–98)
NON-UH HIE ANION GAP:SCNC:PT:SER/PLAS:QN:: 15 MEQ/L (ref 6–16)
NON-UH HIE ASPARTATE AMINOTRANSFERASE:CCNC:PT:SER/PLAS:QN:: 14 INT._UNIT/L (ref 5–43)
NON-UH HIE BASOPHILS/LEUKOCYTES:NFR.DF:PT:BLD:QN:AUTOMATED COUNT: 0 E9/L (ref 0–0.2)
NON-UH HIE BASOPHILS:NCNC:PT:BLD:QN:AUTOMATED COUNT: 0.6 % (ref 0–2)
NON-UH HIE BILIRUBIN:MCNC:PT:SER/PLAS:QN:: 0.9 MG/DL (ref 0–1.1)
NON-UH HIE CALCIUM:MCNC:PT:SER/PLAS:QN:: 9.1 MG/DL (ref 8.9–11.1)
NON-UH HIE CARBON DIOXIDE:SCNC:PT:SER/PLAS:QN:: 24 MMOL/L (ref 21–31)
NON-UH HIE CHLORIDE:SCNC:PT:SER/PLAS:QN:: 103 MMOL/L (ref 101–111)
NON-UH HIE CREATININE:MCNC:PT:SER/PLAS:QN:: 1.3 MG/DL (ref 0.5–1.3)
NON-UH HIE EGFR: 59 ML/MIN/1.73 M2
NON-UH HIE EOSINOPHILS/100 LEUKOCYTES:NFR:PT:BLD:QN:AUTOMATED COUNT: 3 % (ref 0–8)
NON-UH HIE EOSINOPHILS:NCNC:PT:BLD:QN:: 0.2 E9/L (ref 0–0.5)
NON-UH HIE ERYTHROCYTE DISTRIBUTION WIDTH:RATIO:PT:RBC:QN:AUTOMATED COUNT: 14.1 % (ref 10.9–14.2)
NON-UH HIE ERYTHROCYTE MEAN CORPUSCULAR HEMOGLOBIN CONCENTRATION:MCNC:PT:RB: 34.1 GM/DL (ref 31.4–36)
NON-UH HIE ERYTHROCYTE MEAN CORPUSCULAR HEMOGLOBIN:ENTMASS:PT:RBC:QN:AUTOMA: 29.4 PG (ref 27–34)
NON-UH HIE ERYTHROCYTE MEAN CORPUSCULAR VOLUME:ENTVOL:PT:RBC:QN:AUTOMATED C: 86.1 FL (ref 80–100)
NON-UH HIE ERYTHROCYTES:NCNC:PT:BLD:QN:AUTOMATED COUNT: 4.4 E12/L (ref 4.3–5.9)
NON-UH HIE GLOBULIN:MCNC:PT:SER:QN:CALCULATED: 2.6 GM/DL (ref 1.4–4)
NON-UH HIE GLUCOSE:MCNC:PT:SER/PLAS:QN:: 129 MG/DL (ref 55–199)
NON-UH HIE HEMATOCRIT:VFR:PT:BLD:QN:AUTOMATED COUNT: 37.8 % (ref 37.7–49)
NON-UH HIE HEMOGLOBIN:MCNC:PT:BLD:QN:: 12.9 GM/DL (ref 13.5–17.5)
NON-UH HIE LEUKOCYTES: 7.8 E9/L (ref 4–11)
NON-UH HIE LYMPHOCYTES:NCNC:PT:BLD:QN:: 2.2 E9/L (ref 1–4)
NON-UH HIE LYMPHOCYTES:NCNC:PT:BLD:QN:AUTOMATED COUNT: 28.5 % (ref 14–50)
NON-UH HIE MONOCYTES:NCNC:PT:BLD:QN:AUTOMATED COUNT: 0.6 E9/L (ref 0.2–1)
NON-UH HIE NEUTROPHILS/100 LEUKOCYTES:NFR:PT:BLD:QN:: 59.7 % (ref 36–75)
NON-UH HIE NEUTROPHILS:NCNC:PT:BLD:QN:AUTOMATED COUNT: 4.7 E9/L (ref 2–7.5)
NON-UH HIE PLATELET MEAN VOLUME:ENTVOL:PT:BLD:QN:AUTOMATED COUNT: 7.7 FL (ref 6.4–10.8)
NON-UH HIE PLATELET: 187 E9/L (ref 150–500)
NON-UH HIE POTASSIUM:SCNC:PT:SER/PLAS:QN:: 3.6 MMOL/L (ref 3.5–5.3)
NON-UH HIE PROTEIN:MCNC:PT:SER/PLAS:QN:: 6.9 GM/DL (ref 6–7.8)
NON-UH HIE SODIUM:SCNC:PT:SER/PLAS:QN:: 138 MMOL/L (ref 135–145)
NON-UH HIE UREA NITROGEN/CREATININE:MRTO:PT:SER/PLAS:QN:: 19 NO UNITS (ref 10–20)
NON-UH HIE UREA NITROGEN:MCNC:PT:SER/PLAS:QN:: 25 MG/DL (ref 5–21)

## 2024-07-31 LAB — Lab: 6.5 NG/ML (ref 2–20)

## 2024-08-31 LAB
Lab: 16.4 MG/DL
NON-UH HIE U CREATININE: 88.9 MG/DL

## 2024-09-03 LAB — Lab: 51 PG/ML (ref 15–65)

## 2024-09-25 DIAGNOSIS — E55.9 VITAMIN D DEFICIENCY: ICD-10-CM

## 2024-09-25 DIAGNOSIS — Z79.899 IMMUNOSUPPRESSIVE MANAGEMENT ENCOUNTER FOLLOWING KIDNEY TRANSPLANT: ICD-10-CM

## 2024-09-25 DIAGNOSIS — I10 ESSENTIAL HYPERTENSION: ICD-10-CM

## 2024-09-25 DIAGNOSIS — Z94.0 IMMUNOSUPPRESSIVE MANAGEMENT ENCOUNTER FOLLOWING KIDNEY TRANSPLANT: ICD-10-CM

## 2024-09-25 DIAGNOSIS — E21.3 HYPERPARATHYROIDISM (MULTI): ICD-10-CM

## 2024-09-25 DIAGNOSIS — Z94.0 KIDNEY REPLACED BY TRANSPLANT (HHS-HCC): ICD-10-CM

## 2024-09-25 DIAGNOSIS — Z94.4 LIVER REPLACED BY TRANSPLANT (MULTI): ICD-10-CM

## 2024-09-27 LAB
Lab: 31.1 MG/GM CR (ref 0–200)
Lab: 34.7 MG/DL
NON-UH HIE U CREATININE: 111.4 MG/DL

## 2024-10-26 LAB
Lab: 34.2 MG/GM CR (ref 0–200)
Lab: 8.2 MG/DL
NON-UH HIE ALANINE AMINOTRANSFERASE:CCNC:PT:SER/PLAS:QN:NO ADDITION OF P-5': 13 INT._UNIT/L (ref 6–46)
NON-UH HIE ALBUMIN/GLOBULIN:MCRTO:PT:SER:QN:: 1.8 (ref 1.1–2.2)
NON-UH HIE ALBUMIN:MCNC:PT:SER/PLAS:QN:: 4.6 GM/DL (ref 3.3–5)
NON-UH HIE ALKALINE PHOSPHATASE:CCNC:PT:SER/PLAS:QN:: 84 INT._UNIT/L (ref 21–98)
NON-UH HIE ANION GAP:SCNC:PT:SER/PLAS:QN:: 12 MEQ/L (ref 6–16)
NON-UH HIE ASPARTATE AMINOTRANSFERASE:CCNC:PT:SER/PLAS:QN:: 18 INT._UNIT/L (ref 5–43)
NON-UH HIE BASOPHILS/LEUKOCYTES:NFR.DF:PT:BLD:QN:AUTOMATED COUNT: 0.1 E9/L (ref 0–0.2)
NON-UH HIE BASOPHILS:NCNC:PT:BLD:QN:AUTOMATED COUNT: 0.8 % (ref 0–2)
NON-UH HIE BILIRUBIN:MCNC:PT:SER/PLAS:QN:: 1.2 MG/DL (ref 0–1.1)
NON-UH HIE CALCIUM:MCNC:PT:SER/PLAS:QN:: 10 MG/DL (ref 8.9–11.1)
NON-UH HIE CARBON DIOXIDE:SCNC:PT:SER/PLAS:QN:: 29 MMOL/L (ref 21–31)
NON-UH HIE CHLORIDE:SCNC:PT:SER/PLAS:QN:: 102 MMOL/L (ref 101–111)
NON-UH HIE CREATININE:MCNC:PT:SER/PLAS:QN:: 1.4 MG/DL (ref 0.5–1.3)
NON-UH HIE EGFR: 54 ML/MIN/1.73 M2
NON-UH HIE EOSINOPHILS/100 LEUKOCYTES:NFR:PT:BLD:QN:AUTOMATED COUNT: 3 % (ref 0–8)
NON-UH HIE EOSINOPHILS:NCNC:PT:BLD:QN:: 0.3 E9/L (ref 0–0.5)
NON-UH HIE ERYTHROCYTE DISTRIBUTION WIDTH:RATIO:PT:RBC:QN:AUTOMATED COUNT: 14.7 % (ref 10.9–14.2)
NON-UH HIE ERYTHROCYTE MEAN CORPUSCULAR HEMOGLOBIN CONCENTRATION:MCNC:PT:RB: 34.3 GM/DL (ref 31.4–36)
NON-UH HIE ERYTHROCYTE MEAN CORPUSCULAR HEMOGLOBIN:ENTMASS:PT:RBC:QN:AUTOMA: 29.4 PG (ref 27–34)
NON-UH HIE ERYTHROCYTE MEAN CORPUSCULAR VOLUME:ENTVOL:PT:RBC:QN:AUTOMATED C: 85.6 FL (ref 80–100)
NON-UH HIE ERYTHROCYTES:NCNC:PT:BLD:QN:AUTOMATED COUNT: 4.8 E12/L (ref 4.3–5.9)
NON-UH HIE GLOBULIN:MCNC:PT:SER:QN:CALCULATED: 2.6 GM/DL (ref 1.4–4)
NON-UH HIE GLUCOSE:MCNC:PT:SER/PLAS:QN:: 118 MG/DL (ref 55–199)
NON-UH HIE HEMATOCRIT:VFR:PT:BLD:QN:AUTOMATED COUNT: 41 % (ref 37.7–49)
NON-UH HIE HEMOGLOBIN:MCNC:PT:BLD:QN:: 14.1 GM/DL (ref 13.5–17.5)
NON-UH HIE LEUKOCYTES: 9 E9/L (ref 4–11)
NON-UH HIE LYMPHOCYTES:NCNC:PT:BLD:QN:: 2.2 E9/L (ref 1–4)
NON-UH HIE LYMPHOCYTES:NCNC:PT:BLD:QN:AUTOMATED COUNT: 24.3 % (ref 14–50)
NON-UH HIE MONOCYTES:NCNC:PT:BLD:QN:AUTOMATED COUNT: 0.8 E9/L (ref 0.2–1)
NON-UH HIE NEUTROPHILS/100 LEUKOCYTES:NFR:PT:BLD:QN:: 63.1 % (ref 36–75)
NON-UH HIE NEUTROPHILS:NCNC:PT:BLD:QN:AUTOMATED COUNT: 5.7 E9/L (ref 2–7.5)
NON-UH HIE PLATELET MEAN VOLUME:ENTVOL:PT:BLD:QN:AUTOMATED COUNT: 7.8 FL (ref 6.4–10.8)
NON-UH HIE PLATELETS:NCNC:PT:BLD:QN:AUTOMATED COUNT: 193 E9/L (ref 150–500)
NON-UH HIE POTASSIUM:SCNC:PT:SER/PLAS:QN:: 4 MMOL/L (ref 3.5–5.3)
NON-UH HIE PROTEIN:MCNC:PT:SER/PLAS:QN:: 7.2 GM/DL (ref 6–7.8)
NON-UH HIE SODIUM:SCNC:PT:SER/PLAS:QN:: 139 MMOL/L (ref 135–145)
NON-UH HIE U CREATININE: 24 MG/DL
NON-UH HIE UREA NITROGEN/CREATININE:MRTO:PT:SER/PLAS:QN:: 15 NO UNITS (ref 10–20)
NON-UH HIE UREA NITROGEN:MCNC:PT:SER/PLAS:QN:: 21 MG/DL (ref 5–21)

## 2024-10-28 LAB — Lab: 52 PG/ML (ref 15–65)

## 2024-10-29 LAB — Lab: 4.9 NG/ML (ref 2–20)

## 2024-10-30 LAB — NON-UH HIE CALCIDIOL:MCNC:PT:SER/PLAS:QN:: 42.6 NG/ML (ref 30–100)

## 2024-11-27 LAB
Lab: 13.8 MG/DL
Lab: 29.7 MG/GM CR (ref 0–200)
NON-UH HIE U CREATININE: 46.5 MG/DL

## 2024-12-09 ENCOUNTER — APPOINTMENT (OUTPATIENT)
Dept: TRANSPLANT | Facility: HOSPITAL | Age: 69
End: 2024-12-09
Payer: MEDICARE

## 2024-12-09 ENCOUNTER — OFFICE VISIT (OUTPATIENT)
Dept: TRANSPLANT | Facility: HOSPITAL | Age: 69
End: 2024-12-09
Payer: MEDICARE

## 2024-12-09 VITALS
OXYGEN SATURATION: 96 % | WEIGHT: 236.9 LBS | TEMPERATURE: 95.9 F | BODY MASS INDEX: 36.02 KG/M2 | SYSTOLIC BLOOD PRESSURE: 130 MMHG | HEART RATE: 86 BPM | DIASTOLIC BLOOD PRESSURE: 77 MMHG

## 2024-12-09 VITALS
BODY MASS INDEX: 36.02 KG/M2 | WEIGHT: 236.9 LBS | HEART RATE: 86 BPM | SYSTOLIC BLOOD PRESSURE: 130 MMHG | OXYGEN SATURATION: 96 % | DIASTOLIC BLOOD PRESSURE: 77 MMHG | TEMPERATURE: 95.9 F

## 2024-12-09 DIAGNOSIS — Z92.25 PERSONAL HISTORY OF IMMUNOSUPRESSION THERAPY: ICD-10-CM

## 2024-12-09 DIAGNOSIS — Z94.4 LIVER REPLACED BY TRANSPLANT (MULTI): Primary | ICD-10-CM

## 2024-12-09 DIAGNOSIS — Z94.0 KIDNEY REPLACED BY TRANSPLANT (HHS-HCC): ICD-10-CM

## 2024-12-09 DIAGNOSIS — Z94.0 KIDNEY REPLACED BY TRANSPLANT (HHS-HCC): Primary | ICD-10-CM

## 2024-12-09 PROCEDURE — 1159F MED LIST DOCD IN RCRD: CPT | Performed by: HOSPITALIST

## 2024-12-09 PROCEDURE — 1126F AMNT PAIN NOTED NONE PRSNT: CPT | Performed by: INTERNAL MEDICINE

## 2024-12-09 PROCEDURE — 99214 OFFICE O/P EST MOD 30 MIN: CPT | Performed by: HOSPITALIST

## 2024-12-09 PROCEDURE — 99214 OFFICE O/P EST MOD 30 MIN: CPT | Performed by: INTERNAL MEDICINE

## 2024-12-09 PROCEDURE — 1159F MED LIST DOCD IN RCRD: CPT | Performed by: INTERNAL MEDICINE

## 2024-12-09 PROCEDURE — 1126F AMNT PAIN NOTED NONE PRSNT: CPT | Performed by: HOSPITALIST

## 2024-12-09 SDOH — ECONOMIC STABILITY: FOOD INSECURITY: WITHIN THE PAST 12 MONTHS, YOU WORRIED THAT YOUR FOOD WOULD RUN OUT BEFORE YOU GOT MONEY TO BUY MORE.: NEVER TRUE

## 2024-12-09 SDOH — ECONOMIC STABILITY: FOOD INSECURITY: WITHIN THE PAST 12 MONTHS, THE FOOD YOU BOUGHT JUST DIDN'T LAST AND YOU DIDN'T HAVE MONEY TO GET MORE.: NEVER TRUE

## 2024-12-09 ASSESSMENT — ENCOUNTER SYMPTOMS
OCCASIONAL FEELINGS OF UNSTEADINESS: 0
DEPRESSION: 0
CHEST TIGHTNESS: 0
NAUSEA: 0
ABDOMINAL DISTENTION: 0
BACK PAIN: 0
FREQUENCY: 0
VOMITING: 0
CONFUSION: 0
SHORTNESS OF BREATH: 0
PALPITATIONS: 0
LOSS OF SENSATION IN FEET: 0
COUGH: 0
NERVOUS/ANXIOUS: 0
HEADACHES: 0
OCCASIONAL FEELINGS OF UNSTEADINESS: 0
DIARRHEA: 0
HEMATURIA: 0
DEPRESSION: 0
LOSS OF SENSATION IN FEET: 0

## 2024-12-09 ASSESSMENT — PAIN SCALES - GENERAL
PAINLEVEL_OUTOF10: 0-NO PAIN
PAINLEVEL_OUTOF10: 0-NO PAIN

## 2024-12-09 ASSESSMENT — PATIENT HEALTH QUESTIONNAIRE - PHQ9
1. LITTLE INTEREST OR PLEASURE IN DOING THINGS: NOT AT ALL
2. FEELING DOWN, DEPRESSED OR HOPELESS: NOT AT ALL
SUM OF ALL RESPONSES TO PHQ9 QUESTIONS 1 & 2: 0

## 2024-12-09 NOTE — PROGRESS NOTES
Subjective   Patient ID: Dandre Birmingham is a 69 y.o. male who presents for Liver Post Transplant Follow-up.Liver and Kidney transplant.     HPIDoing well with liver and kidney transplant.   Follows with dermatologist and with nephrology here.  Had echo with mild aortic sclerosis at [facility].      Review of Systems  No report of nausea vomiting or diarrhea.      Objective   Physical Exam  Physical Exam  Constitutional:       Appearance: Normal appearance.   Eyes:      General: No scleral icterus.  Cardiovascular:      Rate and Rhythm: Normal rate and regular rhythm.      Heart sounds: No murmur heard.     No gallop.   Pulmonary:      Effort: Pulmonary effort is normal.      Breath sounds: Normal breath sounds.   Abdominal:      General: Abdomen is flat. Bowel sounds are normal. There is no distension.      Palpations: Abdomen is soft. There is no fluid wave, hepatomegaly or splenomegaly.      Tenderness: There is no abdominal tenderness.   Musculoskeletal:      Cervical back: Normal range of motion. No tenderness.      Right lower leg: No edema.      Left lower leg: No edema.   Lymphadenopathy:      Cervical: No cervical adenopathy.   Skin:     Coloration: Skin is not jaundiced.   Neurological:      General: No focal deficit present.      Mental Status: He is alert.       Assessment/Plan     No problems with liver or kidney transplant.   Mild bili elevation likely Gilbert's.   Feels well.   Rides his two Harleys.           Luis Alberto Patel MD 12/09/24 10:59 AM

## 2024-12-09 NOTE — PROGRESS NOTES
Dandre Birmingham   69 y.o.  male with past medical history significant for HCV cirrhosis/HCC, ESLD and End Stage Renal Disease who underwent a simultaneous liver and kidney transplant on 4/4/2016. Post operatively, he had a complicated hospital course with DVTs and bilateral pleural effusions and septic shock requiring pressors and BIPAP. He continues to follow up with liver transplant team and was last seen by Dr. Patel on 11/27/2017. He had a rejection of transplanted kidney and treated with pulse steroid. His baseline creatinine had been around 1.4-1.6.     Interim history: Denied any complaints on today's visit.  Getting 3-month labs and a 6-month follow-up with Dr. Patel and us.      Review of Systems   Respiratory:  Negative for cough, chest tightness and shortness of breath.    Cardiovascular:  Negative for chest pain, palpitations and leg swelling.   Gastrointestinal:  Negative for abdominal distention, diarrhea, nausea and vomiting.   Genitourinary:  Negative for frequency, hematuria and urgency.   Musculoskeletal:  Negative for back pain.   Neurological:  Negative for headaches.   Psychiatric/Behavioral:  Negative for confusion. The patient is not nervous/anxious.         Objective:  Visit Vitals  /77   Pulse 86   Temp 35.5 °C (95.9 °F) (Temporal)   Wt 107 kg (236 lb 14.4 oz)   SpO2 96%   BMI 36.02 kg/m²   BSA 2.27 m²      Physical Exam  HENT:      Head: Normocephalic and atraumatic.      Nose: Nose normal.      Mouth/Throat:      Mouth: Mucous membranes are moist.   Eyes:      Extraocular Movements: Extraocular movements intact.      Pupils: Pupils are equal, round, and reactive to light.   Cardiovascular:      Rate and Rhythm: Normal rate.      Pulses: Normal pulses.      Heart sounds: Normal heart sounds.   Pulmonary:      Effort: Pulmonary effort is normal.   Abdominal:      Palpations: Abdomen is soft.      Tenderness: There is no abdominal tenderness. There is no guarding.   Musculoskeletal:          General: Normal range of motion.      Cervical back: Normal range of motion.   Skin:     General: Skin is warm.   Neurological:      General: No focal deficit present.      Mental Status: Mental status is at baseline.   Psychiatric:         Mood and Affect: Mood normal.            Current Outpatient Medications:     ACETAMINOPHEN ORAL, Take by mouth., Disp: , Rfl:     allopurinol (Zyloprim) 300 mg tablet, Take 1 tablet (300 mg) by mouth once daily., Disp: , Rfl:     atorvastatin (Lipitor) 10 mg tablet, Take 1 tablet (10 mg) by mouth once daily., Disp: , Rfl:     ciclopirox (Loprox) 0.77 % gel, APPLY TOPICALLY TO AFFECTED AREA(S) TWICE DAILY, Disp: , Rfl:     fluticasone (Flonase) 50 mcg/actuation nasal spray, Administer into affected nostril(s)., Disp: , Rfl:     gabapentin (Neurontin) 300 mg capsule, Take 1 capsule (300 mg) by mouth 3 times a day., Disp: 270 capsule, Rfl: 3    magnesium oxide (Mag-Ox) 400 mg (241.3 mg magnesium) tablet, Take by mouth., Disp: , Rfl:     metoprolol tartrate (Lopressor) 25 mg tablet, Take 1 tablet (25 mg) by mouth 2 times a day., Disp: , Rfl:     multivitamin tablet, Take 1 tablet by mouth once daily., Disp: , Rfl:     mycophenolate (Cellcept) 250 mg capsule, TAKE 2 CAPSULES BY MOUTH EVERY 12 HOURS, Disp: 120 capsule, Rfl: 11    predniSONE (Deltasone) 5 mg tablet, Take 1 tablet (5 mg) by mouth once daily., Disp: 30 tablet, Rfl: 11    tacrolimus (Prograf) 1 mg capsule, Take 1 capsule (1 mg) by mouth 2 times a day., Disp: 60 capsule, Rfl: 11    tiZANidine (Zanaflex) 4 mg tablet, , Disp: , Rfl:      [unfilled]     No images are attached to the encounter.     Assessment and Plan :Dandre Birmingham 69 y.o.  male with past medical history significant for HCV cirrhosis/HCC, ESLD and End Stage Renal Disease who underwent a simultaneous liver and kidney transplant on 4/4/2016. Post operatively, he had a complicated hospital course with DVTs and bilateral pleural effusions and septic  shock requiring pressors and BIPAP. He continues to follow up with liver transplant team and was last seen by Dr. Patel on 11/27/2017. He had a rejection of transplanted kidney and treated with pulse steroid. His baseline creatinine had been around 1.4-1.6.     Interim history: Denied any complaints on today's visit.  Getting 3-month labs and a 6-month follow-up with Dr. Patel and us.    Allograft function: Stable creatinine in the range of 1.4-1.5, stable electrolytes.  Will follow-up with the [facility].    Immunosuppression: Recent tacrolimus levels are 4.9 aim levels are 4-6 continue with the current dose of tacrolimus 1 mg 2 times daily, prednisone 5 mg daily, mycophenolate 500 twice daily    Hemodynamics: Blood pressures are optimally controlled continue with metoprolol 25 twice daily    No anemia or leukopenia.    Bone mineral disease: Calcium levels are optimal we will continue with the current management and check vitamin D and PTH with the next labs    General health care: Recommended age-appropriate screening, COVID shots annual dermatology visits,DEXA scan 2-3 yrs while on steroids .    Labs every 3 months and follow-up in 6 months    Lilian Samayoa MD    Notes created by Christopher -Please excuse the Typos .

## 2024-12-10 ENCOUNTER — DOCUMENTATION (OUTPATIENT)
Dept: TRANSPLANT | Facility: HOSPITAL | Age: 69
End: 2024-12-10
Payer: MEDICARE

## 2024-12-10 NOTE — PROGRESS NOTES
Rec'd email from [facility] acct has been sent for processing.  Provided update to patient.

## 2024-12-10 NOTE — PROGRESS NOTES
Rec'd email from the nurse patient was paying bills paid by his insurance.  Per Epic patient is due a refund for 45.87-facility & 6.91-profee.  Called billing & spoke to Nakul who requested account be worked for refund and emailed [facility].  Spoke to patient and he stated he has called several times.

## 2025-01-25 LAB
Lab: 26.5 MG/GM CR (ref 0–200)
Lab: 8 MG/DL
NON-UH HIE ALANINE AMINOTRANSFERASE:CCNC:PT:SER/PLAS:QN:NO ADDITION OF P-5': 14 INT._UNIT/L (ref 6–46)
NON-UH HIE ALBUMIN/GLOBULIN:MCRTO:PT:SER:QN:: 1.8 (ref 1.1–2.2)
NON-UH HIE ALBUMIN:MCNC:PT:SER/PLAS:QN:: 4.5 GM/DL (ref 3.3–5)
NON-UH HIE ALKALINE PHOSPHATASE:CCNC:PT:SER/PLAS:QN:: 76 INT._UNIT/L (ref 21–98)
NON-UH HIE ANION GAP:SCNC:PT:SER/PLAS:QN:: 14 MEQ/L (ref 6–16)
NON-UH HIE ASPARTATE AMINOTRANSFERASE:CCNC:PT:SER/PLAS:QN:: 18 INT._UNIT/L (ref 5–43)
NON-UH HIE BASOPHILS/LEUKOCYTES:NFR.DF:PT:BLD:QN:AUTOMATED COUNT: 0.1 E9/L (ref 0–0.2)
NON-UH HIE BASOPHILS:NCNC:PT:BLD:QN:AUTOMATED COUNT: 0.6 % (ref 0–2)
NON-UH HIE BILIRUBIN:MCNC:PT:SER/PLAS:QN:: 1.4 MG/DL (ref 0–1.1)
NON-UH HIE CALCIDIOL:MCNC:PT:SER/PLAS:QN:: 35.9 NG/ML (ref 30–100)
NON-UH HIE CALCIUM:MCNC:PT:SER/PLAS:QN:: 9.6 MG/DL (ref 8.9–11.1)
NON-UH HIE CARBON DIOXIDE:SCNC:PT:SER/PLAS:QN:: 24 MMOL/L (ref 21–31)
NON-UH HIE CHLORIDE:SCNC:PT:SER/PLAS:QN:: 106 MMOL/L (ref 101–111)
NON-UH HIE CREATININE:MCNC:PT:SER/PLAS:QN:: 1.5 MG/DL (ref 0.5–1.3)
NON-UH HIE EGFR: 50 ML/MIN/1.73 M2
NON-UH HIE EOSINOPHILS/100 LEUKOCYTES:NFR:PT:BLD:QN:AUTOMATED COUNT: 3 % (ref 0–8)
NON-UH HIE EOSINOPHILS:NCNC:PT:BLD:QN:: 0.3 E9/L (ref 0–0.5)
NON-UH HIE ERYTHROCYTE DISTRIBUTION WIDTH:RATIO:PT:RBC:QN:AUTOMATED COUNT: 14.4 % (ref 10.9–14.2)
NON-UH HIE ERYTHROCYTE MEAN CORPUSCULAR HEMOGLOBIN CONCENTRATION:MCNC:PT:RB: 34.7 GM/DL (ref 31.4–36)
NON-UH HIE ERYTHROCYTE MEAN CORPUSCULAR HEMOGLOBIN:ENTMASS:PT:RBC:QN:AUTOMA: 29.3 PG (ref 27–34)
NON-UH HIE ERYTHROCYTE MEAN CORPUSCULAR VOLUME:ENTVOL:PT:RBC:QN:AUTOMATED C: 84.6 FL (ref 80–100)
NON-UH HIE ERYTHROCYTES:NCNC:PT:BLD:QN:AUTOMATED COUNT: 4.8 E12/L (ref 4.3–5.9)
NON-UH HIE GLOBULIN:MCNC:PT:SER:QN:CALCULATED: 2.5 GM/DL (ref 1.4–4)
NON-UH HIE GLUCOSE:MCNC:PT:SER/PLAS:QN:: 120 MG/DL (ref 55–199)
NON-UH HIE HEMATOCRIT:VFR:PT:BLD:QN:AUTOMATED COUNT: 40.9 % (ref 37.7–49)
NON-UH HIE HEMOGLOBIN:MCNC:PT:BLD:QN:: 14.2 GM/DL (ref 13.5–17.5)
NON-UH HIE LEUKOCYTES: 8.4 E9/L (ref 4–11)
NON-UH HIE LYMPHOCYTES:NCNC:PT:BLD:QN:: 2.3 E9/L (ref 1–4)
NON-UH HIE LYMPHOCYTES:NCNC:PT:BLD:QN:AUTOMATED COUNT: 27.1 % (ref 14–50)
NON-UH HIE MONOCYTES:NCNC:PT:BLD:QN:AUTOMATED COUNT: 0.7 E9/L (ref 0.2–1)
NON-UH HIE NEUTROPHILS/100 LEUKOCYTES:NFR:PT:BLD:QN:: 60.9 % (ref 36–75)
NON-UH HIE NEUTROPHILS:NCNC:PT:BLD:QN:AUTOMATED COUNT: 5.1 E9/L (ref 2–7.5)
NON-UH HIE PLATELET MEAN VOLUME:ENTVOL:PT:BLD:QN:AUTOMATED COUNT: 7.6 FL (ref 6.4–10.8)
NON-UH HIE PLATELET: 190 E9/L (ref 150–500)
NON-UH HIE POTASSIUM:SCNC:PT:SER/PLAS:QN:: 3.7 MMOL/L (ref 3.5–5.3)
NON-UH HIE PROTEIN:MCNC:PT:SER/PLAS:QN:: 7 GM/DL (ref 6–7.8)
NON-UH HIE SODIUM:SCNC:PT:SER/PLAS:QN:: 140 MMOL/L (ref 135–145)
NON-UH HIE U CREATININE: 30.2 MG/DL
NON-UH HIE UREA NITROGEN/CREATININE:MRTO:PT:SER/PLAS:QN:: 16 NO UNITS (ref 10–20)
NON-UH HIE UREA NITROGEN:MCNC:PT:SER/PLAS:QN:: 24 MG/DL (ref 5–21)

## 2025-01-26 LAB — Lab: 58 PG/ML (ref 15–65)

## 2025-01-29 LAB — Lab: 6.3 NG/ML (ref 2–20)

## 2025-04-26 LAB
Lab: 26.3 MG/GM CR (ref 0–200)
Lab: 6 MG/DL
NON-UH HIE CALCIDIOL:MCNC:PT:SER/PLAS:QN:: 32.8 NG/ML (ref 30–100)
NON-UH HIE U CREATININE: 22.8 MG/DL

## 2025-04-27 LAB — Lab: 78 PG/ML (ref 15–65)

## 2025-04-29 LAB
ALANINE AMINOTRANSFERASE (SGPT) (U/L) IN SER/PLAS EXTERNAL: 14 U/L
ALBUMIN (G/DL) IN SER/PLAS EXTERNAL: 4.5 G/DL
ALKALINE PHOSPHATASE (U/L) IN SER/PLAS EXTERNAL: 79 U/L
ANION GAP IN SER/PLAS EXTERNAL: 12 MMOL/L
ASPARTATE AMINOTRANSFERASE (SGOT) (U/L) IN SER/PLAS EXTERNAL: 17 U/L
BILIRUBIN TOTAL (MG/DL) IN SER/PLAS EXTERNAL: 1.3 MG/DL
CALCIUM (MG/DL) IN SER/PLAS EXTERNAL: 9.5 MG/DL
CARBON DIOXIDE, TOTAL (MMOL/L) IN SER/PLAS EXTERNAL: 28 MMOL/L
CHLORIDE (MMOL/L) IN SER/PLAS EXTERNAL: 104 MMOL/L
CREATININE (MG/DL) IN SER/PLAS EXTERNAL: 1.5 MG/DL
ERYTHROCYTES (10*6/UL) IN BLOOD BY AUTOMATED COUNT EXTERNAL: 4.8 X10*6/UL
GLOMERULAR FILTRATION RATE ML/MIN/1.73 SQ M.PREDICTED EXTERNAL: 49 ML/MIN/1.73M*2
GLUCOSE: 122
HEMATOCRIT (%) IN BLOOD BY AUTOMATED COUNT EXTERNAL: 40.8 %
HEMOGLOBIN (G/DL) IN BLOOD EXTERNAL: 14.1 G/DL
LEUKOCYTES (10*3/UL) IN BLOOD BY AUTOMATED COUNT EXTERNAL: 7.8 X10*3/UL
POTASSIUM (MMOL/L) IN SER/PLAS EXTERMA;: 4.1 MMOL/L
PROTEIN TOTAL EXTERNAL: 7.1 G/DL
SODIUM (MMOL/L) IN SER/PLAS EXTERNAL: 140 MMOL/L
UREA NITROGEN (MG/DL) IN SER/PLAS EXTERNAL: 24 MG/DL

## 2025-04-30 LAB — Lab: 6.8 NG/ML (ref 5–20)

## 2025-05-21 ENCOUNTER — TELEPHONE (OUTPATIENT)
Facility: HOSPITAL | Age: 70
End: 2025-05-21
Payer: MEDICARE

## 2025-05-21 DIAGNOSIS — Z94.4 LIVER REPLACED BY TRANSPLANT (MULTI): ICD-10-CM

## 2025-05-21 DIAGNOSIS — G62.9 NEUROPATHY: ICD-10-CM

## 2025-05-21 DIAGNOSIS — Z94.0 KIDNEY REPLACED BY TRANSPLANT (HHS-HCC): ICD-10-CM

## 2025-05-21 RX ORDER — MYCOPHENOLATE MOFETIL 250 MG/1
500 CAPSULE ORAL EVERY 12 HOURS
Qty: 120 CAPSULE | Refills: 2 | Status: CANCELLED | OUTPATIENT
Start: 2025-05-21

## 2025-05-21 RX ORDER — TACROLIMUS 1 MG/1
1 CAPSULE ORAL 2 TIMES DAILY
Qty: 60 CAPSULE | Refills: 2 | Status: CANCELLED | OUTPATIENT
Start: 2025-05-21 | End: 2026-05-21

## 2025-05-21 RX ORDER — GABAPENTIN 300 MG/1
300 CAPSULE ORAL 3 TIMES DAILY
Qty: 270 CAPSULE | Refills: 0 | Status: CANCELLED | OUTPATIENT
Start: 2025-05-21

## 2025-05-21 NOTE — TELEPHONE ENCOUNTER
Patient called and said needs refills on tacrolimus and mycophenolate called into [facility] at [phone], and refill on gabapentin [facility] at [phone]. No refills remaining, but approximately 2 week supply left.  Also stated needs labs updated.

## 2025-05-22 ENCOUNTER — PATIENT MESSAGE (OUTPATIENT)
Facility: HOSPITAL | Age: 70
End: 2025-05-22
Payer: MEDICARE

## 2025-05-22 DIAGNOSIS — Z94.4 LIVER REPLACED BY TRANSPLANT (MULTI): ICD-10-CM

## 2025-05-22 DIAGNOSIS — G62.9 NEUROPATHY: ICD-10-CM

## 2025-05-22 DIAGNOSIS — Z94.0 KIDNEY REPLACED BY TRANSPLANT (HHS-HCC): ICD-10-CM

## 2025-05-22 RX ORDER — TACROLIMUS 1 MG/1
1 CAPSULE ORAL 2 TIMES DAILY
Qty: 60 CAPSULE | Refills: 3 | Status: SHIPPED | OUTPATIENT
Start: 2025-05-22 | End: 2026-05-22

## 2025-05-22 RX ORDER — MYCOPHENOLATE MOFETIL 250 MG/1
500 CAPSULE ORAL EVERY 12 HOURS
Qty: 120 CAPSULE | Refills: 3 | Status: SHIPPED | OUTPATIENT
Start: 2025-05-22

## 2025-05-22 RX ORDER — GABAPENTIN 300 MG/1
300 CAPSULE ORAL 3 TIMES DAILY
Qty: 270 CAPSULE | Refills: 1 | Status: SHIPPED | OUTPATIENT
Start: 2025-05-22

## 2025-05-23 RX ORDER — PREDNISONE 5 MG/1
5 TABLET ORAL DAILY
Qty: 30 TABLET | Refills: 11 | Status: SHIPPED | OUTPATIENT
Start: 2025-05-23 | End: 2026-05-23

## 2025-06-09 ENCOUNTER — OFFICE VISIT (OUTPATIENT)
Facility: HOSPITAL | Age: 70
End: 2025-06-09
Payer: MEDICARE

## 2025-06-09 VITALS
TEMPERATURE: 96.8 F | OXYGEN SATURATION: 93 % | WEIGHT: 238.1 LBS | SYSTOLIC BLOOD PRESSURE: 133 MMHG | DIASTOLIC BLOOD PRESSURE: 80 MMHG | HEART RATE: 75 BPM | BODY MASS INDEX: 36.2 KG/M2

## 2025-06-09 VITALS
SYSTOLIC BLOOD PRESSURE: 133 MMHG | DIASTOLIC BLOOD PRESSURE: 80 MMHG | TEMPERATURE: 96.8 F | WEIGHT: 238.1 LBS | BODY MASS INDEX: 36.2 KG/M2 | OXYGEN SATURATION: 93 % | HEART RATE: 75 BPM

## 2025-06-09 DIAGNOSIS — Z94.0 IMMUNOSUPPRESSIVE MANAGEMENT ENCOUNTER FOLLOWING KIDNEY TRANSPLANT: ICD-10-CM

## 2025-06-09 DIAGNOSIS — Z94.4 LIVER REPLACED BY TRANSPLANT (MULTI): Primary | ICD-10-CM

## 2025-06-09 DIAGNOSIS — Z94.4 LIVER REPLACED BY TRANSPLANT (MULTI): ICD-10-CM

## 2025-06-09 DIAGNOSIS — Z79.899 IMMUNOSUPPRESSIVE MANAGEMENT ENCOUNTER FOLLOWING KIDNEY TRANSPLANT: ICD-10-CM

## 2025-06-09 DIAGNOSIS — I10 ESSENTIAL HYPERTENSION: ICD-10-CM

## 2025-06-09 DIAGNOSIS — Z94.0 KIDNEY REPLACED BY TRANSPLANT (HHS-HCC): Primary | ICD-10-CM

## 2025-06-09 DIAGNOSIS — E21.3 HYPERPARATHYROIDISM (MULTI): ICD-10-CM

## 2025-06-09 PROCEDURE — 3075F SYST BP GE 130 - 139MM HG: CPT | Performed by: INTERNAL MEDICINE

## 2025-06-09 PROCEDURE — 99214 OFFICE O/P EST MOD 30 MIN: CPT | Performed by: INTERNAL MEDICINE

## 2025-06-09 PROCEDURE — 1126F AMNT PAIN NOTED NONE PRSNT: CPT | Performed by: INTERNAL MEDICINE

## 2025-06-09 PROCEDURE — 3079F DIAST BP 80-89 MM HG: CPT | Performed by: INTERNAL MEDICINE

## 2025-06-09 PROCEDURE — 1159F MED LIST DOCD IN RCRD: CPT | Performed by: INTERNAL MEDICINE

## 2025-06-09 ASSESSMENT — PAIN SCALES - GENERAL
PAINLEVEL_OUTOF10: 0-NO PAIN
PAINLEVEL_OUTOF10: 0-NO PAIN

## 2025-06-09 NOTE — PROGRESS NOTES
Subjective   Patient ID: Dandre Birmingham is a 70 y.o. male who presents for Liver Post Transplant Follow-up.  HPI Status post liver and kidney transplant 9 years ago.   Doing very well.   On triple immune suppression with prednisone MMF and tacrolimus.  Was transplanted for HCC.  Had HCV with SVR.        Review of Systems  No report of nausea vomiting or diarrhea.     Has neuropathy on MVI and gabapentin.      Objective   Physical Exam  Physical Exam  Constitutional:       Appearance: Normal appearance.   Eyes:      General: No scleral icterus.  Cardiovascular:      Rate and Rhythm: Normal rate and regular rhythm.      Heart sounds: No murmur heard.     No gallop.   Pulmonary:      Effort: Pulmonary effort is normal.      Breath sounds: Normal breath sounds.   Abdominal:      General: Abdomen is flat. Bowel sounds are normal. There is no distension.      Palpations: Abdomen is soft. There is no fluid wave, hepatomegaly or splenomegaly.  Hernias that are easily reducable.   Non-tender.        Tenderness: There is no abdominal tenderness.   Musculoskeletal:      Cervical back: Normal range of motion. No tenderness.      Right lower leg: No edema.      Left lower leg: No edema.   Lymphadenopathy:      Cervical: No cervical adenopathy.   Skin:     Coloration: Skin is not jaundiced.   Neurological:      General: No focal deficit present.      Mental Status: She is alert.       Assessment/Plan     Doing well with liver and kidney transplant.    Continue current immune suppression and follow up in 6 months.              Luis Alberto Patel MD 06/09/25 11:59 AM

## 2025-06-09 NOTE — PATIENT INSTRUCTIONS
Labs every 3 months, we will send new orders in July and mail a copy to you  Follow up in 6 months, we will contact you with an appointment with new liver doctor.

## 2025-06-09 NOTE — PROGRESS NOTES
TRANSPLANT NEPHROLOGY :   OUTPATIENT CLINIC NOTE      SERVICE DATE : 06/09/2025    REASON FOR VISIT/CHIEF COMPLAINT:  S/P  TRANSPLANT SURGERY  IMMUNOSUPPRESSIVE MEDICATION MANAGEMENT  BLOOD PRESSURE MANAGEMENT    HPI:    Mr. Birmingham is a 70 y.o. male with past medical history significant for  HCV cirrhosis/HCC, ESLD and End Stage Renal Disease who underwent a simultaneous liver and kidney transplant on 4/4/2016. Post operatively, he had a complicated hospital course with DVTs and bilateral pleural effusions and septic shock requiring pressors and BIPAP. He continues to follow up with liver transplant team and was last seen by Dr. Patel on 11/27/2017. He had a rejection of transplanted kidney and treated with pulse steroid. His baseline creatinine had been around 1.4-1.6.     Patient is here for follow up s/p kidney transplant.     He was seen together with Dr. Patel, txp hepatology.     Lab from 4/2025 was reviewed, noted elevated PTH (mildly)  Stable kidney function  IS management per Dr. Patel    Patient is doing well overall. No new complaints. Denied chest pain, SOB, B[place]OCK, Palpitation. Normal urination and bowel movement. Normal gait and no weakness of arms/legs. No cough, runny nose, sore throat, cold symptoms, or rash. No hearing loss. Normal vision.No problems with his sleep, mood and function. No recent infection, hospitalization, surgery or ER visits.      ROS:  Review of  14 systems was performed system by system. See HPI. Otherwise, the symptoms were negative.    PAST MEDICAL HISTORY:  Medical History[1]     PAST SURGICAL HISTORY:  Surgical History[2]     SOCIAL HISTORY:  Social History     Socioeconomic History    Marital status:      Spouse name: Not on file    Number of children: Not on file    Years of education: Not on file    Highest education level: Not on file   Occupational History    Not on file   Tobacco Use    Smoking status: Not on file    Smokeless tobacco: Not on file    Substance and Sexual Activity    Alcohol use: Not on file    Drug use: Not on file    Sexual activity: Not on file   Other Topics Concern    Not on file   Social History Narrative    Not on file     Social Drivers of Health     Financial Resource Strain: Not on file   Food Insecurity: No Food Insecurity (12/9/2024)    Hunger Vital Sign     Worried About Running Out of Food in the Last Year: Never true     Ran Out of Food in the Last Year: Never true   Transportation Needs: Not on file   Physical Activity: Not on file   Stress: No Stress Concern Present (12/9/2024)    [employer] - Occupational Stress Questionnaire     Feeling of Stress : Not at all   Social Connections: Not on file   Intimate Partner Violence: Not on file   Housing Stability: Not on file       FAMILY HISTORY:  Family History[3]    MEDICATION LIST:  Current Outpatient Medications   Medication Instructions    ACETAMINOPHEN ORAL Take by mouth.    allopurinol (ZYLOPRIM) 300 mg, Daily    atorvastatin (LIPITOR) 10 mg, Daily    ciclopirox (Loprox) 0.77 % gel APPLY TOPICALLY TO AFFECTED AREA(S) TWICE DAILY    fluticasone (Flonase) 50 mcg/actuation nasal spray Administer into affected nostril(s).    gabapentin (NEURONTIN) 300 mg, oral, 3 times daily    magnesium oxide (Mag-Ox) 400 mg (241.3 mg magnesium) tablet Take by mouth.    metoprolol tartrate (Lopressor) 25 mg tablet 1 tablet, 2 times daily    multivitamin tablet 1 tablet, Daily    mycophenolate (CELLCEPT) 500 mg, oral, Every 12 hours    predniSONE (DELTASONE) 5 mg, oral, Daily    tacrolimus (PROGRAF) 1 mg, oral, 2 times daily    tiZANidine (Zanaflex) 4 mg tablet        ALLERGY  Allergies[4]    PHYSICAL EXAM:    Visit Vitals  /80   Pulse 75   Temp 36 °C (96.8 °F) (Temporal)   Wt 108 kg (238 lb 1.6 oz)   SpO2 93%   BMI 36.20 kg/m²   BSA 2.28 m²          Vital signs - reviewed. Acceptable BP at this office visit.   General Appearance - NAD, Good speech, oriented and  alert  HEENT - Supple. Not pale. No jaundice. No cervical lymphadenopathy. Pharynx and tonsils are not injected.  CVS - RRR. Normal S1/S2. No murmur, click , rub or gallop  Lungs- clear to auscultation bilaterally  Abdomen - soft , not tender, no guarding, no rigidity. No hepatosplenomegaly. Normal bowel sounds. No masses and ascites. S/P Kidney transplant .  Transplanted kidney is not tender.   Musculoskeletal /Extremities - no edema. Full ROM. No joint tenderness.   Neuro/Psych - appropriate mood and affect. Motor power V/V all extremities. CN I -XII were grossly intact.  Skin - No visible rash      LABS:    Lab Results   Component Value Date    WBC 7.8 04/26/2025    HGB 14.1 04/26/2025    HCT 40.8 04/26/2025     04/27/2024    CHOL 189 04/19/2018    CHOL 189 04/19/2018    TRIG 263 (H) 04/19/2018    HDL 36.2 (A) 04/19/2018    HDL 36.2 (A) 04/19/2018    ALT 14 04/26/2025    AST 17 04/26/2025     04/26/2025    K 4.1 04/26/2025     04/26/2025    CREATININE 1.50 04/26/2025    BUN 24 04/26/2025    CO2 28 04/26/2025    INR 1.0 10/12/2017    HGBA1C 5.1 04/19/2018     par    ASSESSMENT AND PLAN:    Mr. Birmingham is a 70 y.o. male  who is here for follow up s/p kidney transplant.    TRANSPLANT DATE: 4/4/2016 (Kidney), 4/4/2016 (Liver)      1. ESRD S/P kidney transplant   - Creatinine last check was :  Lab Results   Component Value Date    CREATININE 1.50 04/26/2025       - Renal allograft function is stable  UPC ratio WNL.     -Ensure adequate hydration  - Avoid nephrotoxic medications, NSAIDs, and IV contrast.    2. Immunosuppression  Defer to Dr. Patel  -Continue current immunosuppression regimen.    3. Electrolytes  Lab Results   Component Value Date    GLUCOSE 100 04/27/2024    CALCIUM 9.5 04/26/2025     04/26/2025    K 4.1 04/26/2025    CO2 28 04/26/2025     04/26/2025    BUN 24 04/26/2025    CREATININE 1.50 04/26/2025     -Acceptable from last lab drawn    4. Hypertension  Blood Pressures   "       6/9/2025  1031             BP: 133/80          -Home  BP had been acceptable  -Encourage to monitor home BP  -Continue current anti hypertensive medication    5. Bone Mineral Disease/Osteoporosis  Lab Results   Component Value Date    CALCIUM 9.5 04/26/2025     -check VIT D, PTH q 3 months  - Consider DEXA every 2-3 years , defer to PCP  - May consider initiation of Sensipar when PTH >300 AND Ca >8.4    6.Anemia  Lab Results   Component Value Date    WBC 7.8 04/26/2025    HGB 14.1 04/26/2025    HCT 40.8 04/26/2025    MCV 92 10/26/2017     04/27/2024     No results found for: \"IRON\", \"TIBC\", \"FERRITIN\"  -asymptomatic  - Continue to monitor  -check iron studies and ferritin. Will consider MIRACLE as needed.  - No indications for blood transfusion     7.Health maintenance and vaccination  - Flu shot during flu season annually  - Cancer screening is up to date per the patient    Summary    Routine txp lab and IS management per liver txp team   BP/Kidney transplant function are acceptable  Can take OTC Ca 500-600 mg/Vit D3@ 800-100 units per day  Provide info about kidney stone and calcium supplement from [facility] to patient [[email or web address]]  Routine txp lab per liver txp  RTC 6 months with liver txp team    Jeremy West MD    Transplant Nephrology          [1]   Past Medical History:  Diagnosis Date    Personal history of other diseases of the circulatory system     History of hypertension    Personal history of other diseases of the musculoskeletal system and connective tissue     History of arthritis    Personal history of other infectious and parasitic diseases 01/05/2021    History of hepatitis C virus infection    Personal history of urinary calculi     History of renal calculi   [2]   Past Surgical History:  Procedure Laterality Date    CT ABDOMEN PELVIS ANGIOGRAM W AND/OR WO IV CONTRAST  3/31/2016    CT ABDOMEN " PELVIS ANGIOGRAM W AND/OR WO IV CONTRAST 3/31/2016 [facility]    CT ANGIO NECK W AND WO IV CONTRAST  3/11/2016    CT NECK ANGIO W AND WO IV CONTRAST 3/11/2016 [facility] INPATIENT [facility]GAC[facility]    CT HEAD ANGIO W AND WO IV CONTRAST  3/11/2016    CT HEAD ANGIO W AND WO IV CONTRAST 3/11/2016 [facility] INPATIENT [facility]    HERNIA REPAIR  12/10/2015    Hernia Repair    IR ANGIOGRAM AORTA THORACIC  3/31/2016    IR ANGIOGRAM AORTA THORACIC 3/31/2016 [facility]PATIENT [facility]    IR EMBOLIZATION LYMPH NODE Bilateral 3/31/2016    IR EMBOLIZATION LYMPH NODE 3/31/2016 [facility] INPATIENT [facility]GAC[facility]    KIDNEY SURGERY  07/28/2016    Kidney Surgery    KNEE ARTHROSCOPY W/ DEBRIDEMENT  12/10/2015    Knee Arthroscopy (Therapeutic)    MR HEAD ANGIO WO IV CONTRAST  3/12/2016    MR HEAD ANGIO WO IV CONTRAST 3/12/2016 [facility]PATIENT [facility]    MR NECK ANGIO WO IV CONTRAST  3/12/2016    MR NECK ANGIO WO IV CONTRAST 3/12/2016 [facility]PATIENT [facility]    OTHER SURGICAL HISTORY  01/05/2021    Liver Transplant - Orthotopic    OTHER SURGICAL HISTORY  07/28/2016    Partial Hepatectomy    TONSILLECTOMY  12/10/2015    Tonsillectomy    US GUIDED ABDOMINAL PARACENTESIS  2/2/2016    US GUIDED ABDOMINAL PARACENTESIS 2/2/2016 [facility]PATIENT [facility]GAC[facility]    US GUIDED ABDOMINAL PARACENTESIS  2/24/2016    US GUIDED ABDOMINAL PARACENTESIS 2/24/2016 [facility]PATIENT [facility]    US GUIDED ABDOMINAL PARACENTESIS  3/3/2016    US GUIDED ABDOMINAL PARACENTESIS 3/3/2016 [facility]PATIENT [facility]    US GUIDED ABDOMINAL PARACENTESIS  3/5/2016    US GUIDED ABDOMINAL PARACENTESIS 3/5/2016 [facility]PATIENT [facility]    US GUIDED ABDOMINAL PARACENTESIS  3/6/2016    US GUIDED ABDOMINAL PARACENTESIS 3/6/2016 [facility]IENT [facility]    US GUIDED ABDOMINAL PARACENTESIS  3/7/2016    US GUIDED ABDOMINAL PARACENTESIS 3/7/2016 [facility]PATIENT LE[facility]    US GUIDED ABSCESS DRAIN  2/5/2016    US GUIDED ABSCESS DRAIN 2/5/2016 [facility] INPATIENT LEGAC[facility]    US GUIDED ABSCESS DRAIN  3/14/2016    US GUIDED ABSCESS DRAIN 3/14/2016 [facility] INPATIENT  LEGACY    US GUIDED NEEDLE LIVER BIOPSY  4/29/2016    US GUIDED NEEDLE LIVER BIOPSY 4/29/2016 [facility] INPATIENT LEGACY    US GUIDED PERCUTANEOUS PERITONEAL OR RETROPERITONEAL FLUID COLLECTION DRAINAGE  2/5/2016    US GUIDED PERCUTANEOUS PERITONEAL OR RETROPERITONEAL FLUID COLLECTION DRAINAGE 2/5/2016 [facility] INPATIENT LEGACY   [3] No family history on file.  [4] No Known Allergies

## 2025-07-22 ENCOUNTER — TELEPHONE (OUTPATIENT)
Facility: HOSPITAL | Age: 70
End: 2025-07-22
Payer: MEDICARE

## 2025-07-22 DIAGNOSIS — I10 ESSENTIAL HYPERTENSION: ICD-10-CM

## 2025-07-22 DIAGNOSIS — Z94.4 LIVER REPLACED BY TRANSPLANT (MULTI): ICD-10-CM

## 2025-07-22 DIAGNOSIS — Z94.0 IMMUNOSUPPRESSIVE MANAGEMENT ENCOUNTER FOLLOWING KIDNEY TRANSPLANT: ICD-10-CM

## 2025-07-22 DIAGNOSIS — Z79.899 IMMUNOSUPPRESSIVE MANAGEMENT ENCOUNTER FOLLOWING KIDNEY TRANSPLANT: ICD-10-CM

## 2025-07-22 DIAGNOSIS — Z94.0 KIDNEY REPLACED BY TRANSPLANT (HHS-HCC): ICD-10-CM

## 2025-07-23 ENCOUNTER — PATIENT MESSAGE (OUTPATIENT)
Facility: HOSPITAL | Age: 70
End: 2025-07-23
Payer: MEDICARE

## 2025-07-23 DIAGNOSIS — Z94.0 KIDNEY REPLACED BY TRANSPLANT (HHS-HCC): ICD-10-CM

## 2025-07-26 LAB
Lab: 14.5 MG/GM CR (ref 0–200)
Lab: 37 NG/ML (ref 30–100)
Lab: 6 MG/DL
NON-UH HIE A/G RATIO: 1.8 (ref 1.1–2.2)
NON-UH HIE AGAP: 11 MEQ/L (ref 6–16)
NON-UH HIE ALBUMIN LVL: 4.4 GM/DL (ref 3.3–5)
NON-UH HIE ALK PHOS: 79 INT._UNIT/L (ref 21–98)
NON-UH HIE ALT: 13 INT._UNIT/L (ref 6–46)
NON-UH HIE AST: 16 INT._UNIT/L (ref 5–43)
NON-UH HIE BASOPHIL ABSOLUTE: 0.1 E9/L (ref 0–0.2)
NON-UH HIE BASOPHIL AUTO: 0.8 % (ref 0–2)
NON-UH HIE BILI TOTAL: 1 MG/DL (ref 0–1.1)
NON-UH HIE BUN/CREAT RATIO: 16 NO UNITS (ref 10–20)
NON-UH HIE BUN: 26 MG/DL (ref 5–21)
NON-UH HIE CALCIUM LVL: 9.3 MG/DL (ref 8.9–11.1)
NON-UH HIE CHLORIDE: 105 MMOL/L (ref 101–111)
NON-UH HIE CO2: 27 MMOL/L (ref 21–31)
NON-UH HIE CREATININE: 1.6 MG/DL (ref 0.5–1.3)
NON-UH HIE EGFR: 46 ML/MIN/1.73 M2
NON-UH HIE EOS ABSOLUTE: 0.3 E9/L (ref 0–0.5)
NON-UH HIE EOS AUTO: 3.4 % (ref 0–8)
NON-UH HIE GLOBULIN: 2.5 GM/DL (ref 1.4–4)
NON-UH HIE GLUCOSE LVL: 131 MG/DL (ref 55–199)
NON-UH HIE HCT: 39.5 % (ref 37.7–49)
NON-UH HIE HGB: 13.6 GM/DL (ref 13.5–17.5)
NON-UH HIE LYMPH ABSOLUTE: 2 E9/L (ref 1–4)
NON-UH HIE LYMPH AUTO: 26.1 % (ref 14–50)
NON-UH HIE MCH: 29.2 PG (ref 27–34)
NON-UH HIE MCHC: 34.4 GM/DL (ref 31.4–36)
NON-UH HIE MCV: 84.9 FL (ref 80–100)
NON-UH HIE MONO ABSOLUTE: 0.6 E9/L (ref 0.2–1)
NON-UH HIE MONO AUTO: 7.3 % (ref 4–14)
NON-UH HIE MPV: 7.7 FL (ref 6.4–10.8)
NON-UH HIE NEUTRO ABSOLUTE: 4.7 E9/L (ref 2–7.5)
NON-UH HIE NEUTRO AUTO: 62.4 % (ref 36–75)
NON-UH HIE PLATELET: 184 E9/L (ref 150–500)
NON-UH HIE POTASSIUM LVL: 3.9 MMOL/L (ref 3.5–5.3)
NON-UH HIE RBC: 4.7 E12/L (ref 4.3–5.9)
NON-UH HIE RDW: 15.1 % (ref 10.9–14.2)
NON-UH HIE SODIUM LVL: 139 MMOL/L (ref 135–145)
NON-UH HIE TOTAL PROTEIN: 6.9 GM/DL (ref 6–7.8)
NON-UH HIE U CREATININE: 41.5 MG/DL
NON-UH HIE WBC: 7.6 E9/L (ref 4–11)

## 2025-07-30 LAB — Lab: 6.4 NG/ML (ref 5–20)

## 2025-09-05 ENCOUNTER — TELEPHONE (OUTPATIENT)
Facility: HOSPITAL | Age: 70
End: 2025-09-05
Payer: MEDICARE

## 2025-09-05 DIAGNOSIS — Z94.4 LIVER REPLACED BY TRANSPLANT (MULTI): ICD-10-CM

## 2025-09-05 DIAGNOSIS — Z94.0 KIDNEY REPLACED BY TRANSPLANT (HHS-HCC): ICD-10-CM

## 2025-09-05 DIAGNOSIS — G62.9 NEUROPATHY: ICD-10-CM

## 2025-09-05 RX ORDER — MYCOPHENOLATE MOFETIL 250 MG/1
500 CAPSULE ORAL EVERY 12 HOURS
Qty: 120 CAPSULE | Refills: 11 | Status: SHIPPED | OUTPATIENT
Start: 2025-09-05

## 2025-09-05 RX ORDER — PREDNISONE 5 MG/1
5 TABLET ORAL DAILY
Qty: 30 TABLET | Refills: 11 | Status: SHIPPED | OUTPATIENT
Start: 2025-09-05 | End: 2026-09-05

## 2025-09-05 RX ORDER — GABAPENTIN 300 MG/1
300 CAPSULE ORAL 3 TIMES DAILY
Qty: 270 CAPSULE | Refills: 11 | Status: SHIPPED | OUTPATIENT
Start: 2025-09-05

## 2025-09-05 RX ORDER — TACROLIMUS 1 MG/1
1 CAPSULE ORAL 2 TIMES DAILY
Qty: 60 CAPSULE | Refills: 11 | Status: SHIPPED | OUTPATIENT
Start: 2025-09-05 | End: 2026-09-05